# Patient Record
Sex: FEMALE | Race: WHITE | NOT HISPANIC OR LATINO
[De-identification: names, ages, dates, MRNs, and addresses within clinical notes are randomized per-mention and may not be internally consistent; named-entity substitution may affect disease eponyms.]

---

## 2018-08-02 PROBLEM — Z00.00 ENCOUNTER FOR PREVENTIVE HEALTH EXAMINATION: Status: ACTIVE | Noted: 2018-08-02

## 2018-08-09 ENCOUNTER — APPOINTMENT (OUTPATIENT)
Dept: OTOLARYNGOLOGY | Facility: CLINIC | Age: 68
End: 2018-08-09
Payer: MEDICARE

## 2018-08-09 VITALS
DIASTOLIC BLOOD PRESSURE: 73 MMHG | HEIGHT: 61 IN | WEIGHT: 130 LBS | SYSTOLIC BLOOD PRESSURE: 137 MMHG | HEART RATE: 75 BPM | BODY MASS INDEX: 24.55 KG/M2

## 2018-08-09 PROCEDURE — 99204 OFFICE O/P NEW MOD 45 MIN: CPT

## 2018-09-06 ENCOUNTER — APPOINTMENT (OUTPATIENT)
Dept: OTOLARYNGOLOGY | Facility: CLINIC | Age: 68
End: 2018-09-06
Payer: MEDICARE

## 2018-09-06 VITALS
SYSTOLIC BLOOD PRESSURE: 134 MMHG | WEIGHT: 130 LBS | DIASTOLIC BLOOD PRESSURE: 81 MMHG | HEART RATE: 68 BPM | BODY MASS INDEX: 24.56 KG/M2

## 2018-09-06 DIAGNOSIS — Z83.3 FAMILY HISTORY OF DIABETES MELLITUS: ICD-10-CM

## 2018-09-06 DIAGNOSIS — Z82.2 FAMILY HISTORY OF DEAFNESS AND HEARING LOSS: ICD-10-CM

## 2018-09-06 DIAGNOSIS — Z84.1 FAMILY HISTORY OF DISORDERS OF KIDNEY AND URETER: ICD-10-CM

## 2018-09-06 PROCEDURE — 99214 OFFICE O/P EST MOD 30 MIN: CPT

## 2018-09-06 RX ORDER — DILTIAZEM HYDROCHLORIDE 120 MG/1
120 TABLET, COATED ORAL
Refills: 0 | Status: ACTIVE | COMMUNITY

## 2018-09-06 RX ORDER — IRBESARTAN 150 MG/1
150 TABLET, FILM COATED ORAL
Refills: 0 | Status: ACTIVE | COMMUNITY

## 2018-09-06 RX ORDER — VIT C/E/CUPERIC/ZINC/LUTEIN 226-90-0.8
CAPSULE ORAL
Refills: 0 | Status: ACTIVE | COMMUNITY

## 2018-09-06 RX ORDER — ASPIRIN 81 MG
600-200 TABLET, DELAYED RELEASE (ENTERIC COATED) ORAL
Refills: 0 | Status: ACTIVE | COMMUNITY

## 2018-09-06 RX ORDER — ETANERCEPT 50 MG/ML
50 SOLUTION SUBCUTANEOUS
Refills: 0 | Status: ACTIVE | COMMUNITY

## 2018-09-06 RX ORDER — ATORVASTATIN CALCIUM 10 MG/1
10 TABLET, FILM COATED ORAL
Refills: 0 | Status: ACTIVE | COMMUNITY

## 2019-03-07 ENCOUNTER — APPOINTMENT (OUTPATIENT)
Dept: OTOLARYNGOLOGY | Facility: CLINIC | Age: 69
End: 2019-03-07

## 2022-06-13 ENCOUNTER — NON-APPOINTMENT (OUTPATIENT)
Age: 72
End: 2022-06-13

## 2023-06-11 ENCOUNTER — NON-APPOINTMENT (OUTPATIENT)
Age: 73
End: 2023-06-11

## 2023-06-21 ENCOUNTER — APPOINTMENT (OUTPATIENT)
Dept: ORTHOPEDIC SURGERY | Facility: CLINIC | Age: 73
End: 2023-06-21
Payer: MEDICARE

## 2023-06-21 ENCOUNTER — NON-APPOINTMENT (OUTPATIENT)
Age: 73
End: 2023-06-21

## 2023-06-21 VITALS
SYSTOLIC BLOOD PRESSURE: 171 MMHG | HEART RATE: 79 BPM | BODY MASS INDEX: 24.55 KG/M2 | DIASTOLIC BLOOD PRESSURE: 83 MMHG | HEIGHT: 61 IN | WEIGHT: 130 LBS

## 2023-06-21 DIAGNOSIS — M17.12 UNILATERAL PRIMARY OSTEOARTHRITIS, LEFT KNEE: ICD-10-CM

## 2023-06-21 DIAGNOSIS — M17.11 UNILATERAL PRIMARY OSTEOARTHRITIS, RIGHT KNEE: ICD-10-CM

## 2023-06-21 PROCEDURE — 73564 X-RAY EXAM KNEE 4 OR MORE: CPT | Mod: LT

## 2023-06-21 PROCEDURE — 73522 X-RAY EXAM HIPS BI 3-4 VIEWS: CPT

## 2023-06-21 PROCEDURE — 99204 OFFICE O/P NEW MOD 45 MIN: CPT | Mod: 25

## 2023-06-21 PROCEDURE — 20610 DRAIN/INJ JOINT/BURSA W/O US: CPT | Mod: LT

## 2023-06-21 PROCEDURE — 73560 X-RAY EXAM OF KNEE 1 OR 2: CPT | Mod: RT

## 2023-06-21 NOTE — DISCUSSION/SUMMARY
[de-identified] : This patient does have a psoriatic arthritis with severe deformity of her hands she also has arthritis in her left hip and in both knees.  She tolerated a local injection in her hip extremely well.  We will follow her conservatively at this time.  She is being treated medically by Dr. Braulio Rosario.  Return visit in 3 months.

## 2023-06-21 NOTE — PROCEDURE
[de-identified] : Procedure Note: \par \par Anatomic Location: left  hip\par \par Diagnosis: left  hip arthritis\par \par Procedure:  Injection of 6ccs of Xylocaine % plain and Kenalog 40, 1 cc\par \par Local Spray: Ethyl Chloride.\par \par Skin preparation with alcohol.\par \par Patient has consented for the procedure.\par \par Injection 20-gauge spinal needle  through an anterior  lateral approach.\par \par Patient tolerated the procedure well.\par

## 2023-06-21 NOTE — HISTORY OF PRESENT ILLNESS
[de-identified] : Ms. MULUGETA SRIVASTAVA is a 72 year female who presents to office complaining of left hip pain x 5 months with insidious onset.\par Denies injury, trauma, or fall to the hip.\par Pain is described as an intermittent, localized, sharp pain centered around the anterolateral aspect of the hip.\par Pain is aggravated with lying on the affected hip and with getting up from a seated position and walking up and down stairs.\par Pain is improved with rest, ice, and Tylenol.\par Denies associated radiating pain or distal neuropathy.\par She has a rheumatologist (Dr. Braulio Rosario) for severe psoriatic arthritis (taking Enbrel, now Skyrizi, as well as Celebrex) who has evaluated the patient for this pain and has obtained radiology images (x-ray, CT scan, and MRI).\par She had received a left hip GT bursa injection by Dr. Rosario about 2 months ago, which did not help her hip pain.\par All review of systems, family history, social history, surgical history, past medical history, medications, and allergies not previously stated as positive are negative. They were reviewed by me today with the patient and documented accordingly.

## 2023-06-21 NOTE — CONSULT LETTER
[FreeTextEntry1] : Dear Dr. Braulio Rosario\par \par I had the pleasure of evaluating your patient in the office today. I am sending you a copy of my Orthopaedic consult note for your record. If you have any questions, please do not hesitate to contact my office.\par \par Sincerely,\par \par Todd Vargas MD\par Chief, Adult Joint Reconstruction\par [facility]

## 2023-06-21 NOTE — PHYSICAL EXAM
[de-identified] : Constitutional - the patient is of normal build and nutrition.  She does have marked deformity of both of her hands as well as the toes of her feet due to her psoriatic arthritis.  The patient remains oriented to person, time, and  place.  Mood is normal. Vital signs as recorded.  The patients gait is slow with some discomfort in her left hip but she is managing satisfactorily.. The patient has satisfactory  balance and can stand on toes and heels.\par \par The patient has no difficulty with respiration. Respiration at rest is a normal rate. The patient is not short of breath and has not become short of breath with short ambulation. There is no audible wheezing. No coughing.\par \par Skin is normal for the patient's age. There are no abnormal masses or lymph nodes which stand out in the lower extremities.\par \par Spine - deep tendon reflexes are symmetric. Motor and sensory are symmetric.\par \par \par UPPER EXTREMITIES \par \par Shoulders ROM  is symmetric  and the motion is satisfactory.  There is no significant shoulder pain or limitation in motion which would make using a cane or a walker difficult. Shoulder stability and  strength are satisfactory.\par \par Her hands show multiple deformities is secondary to her psoriatic arthritis.\par \par There is normal motion in the wrists and elbows.\par \par Circulation appears satisfactory with pedal pulses present.  There is no major edema in the lower legs. No skin tenderness or increased temperature. No major varicosities.\par \par HIP EXAMINATION the abduction and abduction as well as rotation measurements were taken with the hip in flexion.\par \par Motion\par Her hip motion shows at this time flexion right hip 130 left hip 125, abduction right hip 70 the left hip 50, adduction right hip 35 left hip 20, external rotation right hip 70 left hip 50, internal rotation right hip 20 left hip 10.  She has some discomfort in the left groin and left hip area with weightbearing and with the extremities of range of motion.  \par \par The hips have good range of motion.  He does guard her left hip slightly.   There is no crepitus in either hip. The alignment of the hips is normal.\par \par \par KNEE EXAMINATION\par \par Motion\par Right Knee has 0 to 125 degrees of motion with good medial  lateral and anterior posterior stability.  There is no major effusion.  There is no Baker's cyst.  There is t patellofemoral crepitus.  She does have intermittent pain in her knees however at this time her right knee is feeling satisfactorily the patient has satisfactory strength across the knee.               \par Left  Knee   has 0 to 135 degrees of motion with good medial lateral and anterior posterior stability.  There is no major effusion there is no Baker's cyst.  There is  patellofemoral crepitus.  This knee also has intermittent discomfort at this time again she says she is functioning satisfactorily.  The patient has satisfactory strength across the knee.            \par \par Ankle and foot examination\par Of the ankle has normal motion.  There is normal ankle stability.  The patient has no major abnormalities of the foot.\par \par \par \par  [de-identified] : An AP of the pelvis and a lateral of the right and left hips shows the femoral heads are round the right hip shows joint space maintained throughout the hip the left hip shows an increased peripheral osteophyte around the superior acetabulum and loss of superior cartilage space.\par \par 4 views were done of the left knee and an AP and Roa view of the right knee.  These do show significant joint space arthritis in the medial and lateral compartments of both knees but bone against bone medially the right knee may be more severe at this time than the left and there are left knee peripheral degenerative changes.  Impression severe osteoarthritis knees psoriatic arthritis by history.

## 2023-06-23 ENCOUNTER — TRANSCRIPTION ENCOUNTER (OUTPATIENT)
Age: 73
End: 2023-06-23

## 2023-08-02 ENCOUNTER — APPOINTMENT (OUTPATIENT)
Dept: ORTHOPEDIC SURGERY | Facility: CLINIC | Age: 73
End: 2023-08-02
Payer: MEDICARE

## 2023-08-02 VITALS
DIASTOLIC BLOOD PRESSURE: 81 MMHG | HEIGHT: 61 IN | SYSTOLIC BLOOD PRESSURE: 136 MMHG | HEART RATE: 71 BPM | BODY MASS INDEX: 25.3 KG/M2 | WEIGHT: 134 LBS

## 2023-08-02 DIAGNOSIS — M17.0 BILATERAL PRIMARY OSTEOARTHRITIS OF KNEE: ICD-10-CM

## 2023-08-02 DIAGNOSIS — M16.12 UNILATERAL PRIMARY OSTEOARTHRITIS, LEFT HIP: ICD-10-CM

## 2023-08-02 PROCEDURE — 99214 OFFICE O/P EST MOD 30 MIN: CPT

## 2023-08-17 ENCOUNTER — OUTPATIENT (OUTPATIENT)
Dept: OUTPATIENT SERVICES | Facility: HOSPITAL | Age: 73
LOS: 1 days | End: 2023-08-17
Payer: MEDICARE

## 2023-08-17 ENCOUNTER — NON-APPOINTMENT (OUTPATIENT)
Age: 73
End: 2023-08-17

## 2023-08-17 VITALS
SYSTOLIC BLOOD PRESSURE: 117 MMHG | TEMPERATURE: 98 F | WEIGHT: 132.94 LBS | HEIGHT: 61 IN | HEART RATE: 88 BPM | OXYGEN SATURATION: 97 % | DIASTOLIC BLOOD PRESSURE: 78 MMHG

## 2023-08-17 DIAGNOSIS — Z01.818 ENCOUNTER FOR OTHER PREPROCEDURAL EXAMINATION: ICD-10-CM

## 2023-08-17 DIAGNOSIS — Z90.49 ACQUIRED ABSENCE OF OTHER SPECIFIED PARTS OF DIGESTIVE TRACT: Chronic | ICD-10-CM

## 2023-08-17 DIAGNOSIS — M16.12 UNILATERAL PRIMARY OSTEOARTHRITIS, LEFT HIP: ICD-10-CM

## 2023-08-17 DIAGNOSIS — Z98.890 OTHER SPECIFIED POSTPROCEDURAL STATES: Chronic | ICD-10-CM

## 2023-08-17 DIAGNOSIS — Z29.9 ENCOUNTER FOR PROPHYLACTIC MEASURES, UNSPECIFIED: ICD-10-CM

## 2023-08-17 LAB
A1C WITH ESTIMATED AVERAGE GLUCOSE RESULT: 6.4 % — HIGH (ref 4–5.6)
ANION GAP SERPL CALC-SCNC: 15 MMOL/L — SIGNIFICANT CHANGE UP (ref 5–17)
BLD GP AB SCN SERPL QL: NEGATIVE — SIGNIFICANT CHANGE UP
BUN SERPL-MCNC: 24 MG/DL — HIGH (ref 7–23)
CALCIUM SERPL-MCNC: 10.8 MG/DL — HIGH (ref 8.4–10.5)
CHLORIDE SERPL-SCNC: 102 MMOL/L — SIGNIFICANT CHANGE UP (ref 96–108)
CO2 SERPL-SCNC: 24 MMOL/L — SIGNIFICANT CHANGE UP (ref 22–31)
CREAT SERPL-MCNC: 0.92 MG/DL — SIGNIFICANT CHANGE UP (ref 0.5–1.3)
EGFR: 65 ML/MIN/1.73M2 — SIGNIFICANT CHANGE UP
ESTIMATED AVERAGE GLUCOSE: 137 MG/DL — HIGH (ref 68–114)
GLUCOSE SERPL-MCNC: 124 MG/DL — HIGH (ref 70–99)
HCT VFR BLD CALC: 45.8 % — HIGH (ref 34.5–45)
HGB BLD-MCNC: 15.1 G/DL — SIGNIFICANT CHANGE UP (ref 11.5–15.5)
MCHC RBC-ENTMCNC: 30.1 PG — SIGNIFICANT CHANGE UP (ref 27–34)
MCHC RBC-ENTMCNC: 33 GM/DL — SIGNIFICANT CHANGE UP (ref 32–36)
MCV RBC AUTO: 91.2 FL — SIGNIFICANT CHANGE UP (ref 80–100)
NRBC # BLD: 0 /100 WBCS — SIGNIFICANT CHANGE UP (ref 0–0)
PLATELET # BLD AUTO: 335 K/UL — SIGNIFICANT CHANGE UP (ref 150–400)
POTASSIUM SERPL-MCNC: 5.2 MMOL/L — SIGNIFICANT CHANGE UP (ref 3.5–5.3)
POTASSIUM SERPL-SCNC: 5.2 MMOL/L — SIGNIFICANT CHANGE UP (ref 3.5–5.3)
RBC # BLD: 5.02 M/UL — SIGNIFICANT CHANGE UP (ref 3.8–5.2)
RBC # FLD: 13.2 % — SIGNIFICANT CHANGE UP (ref 10.3–14.5)
RH IG SCN BLD-IMP: POSITIVE — SIGNIFICANT CHANGE UP
SODIUM SERPL-SCNC: 141 MMOL/L — SIGNIFICANT CHANGE UP (ref 135–145)
WBC # BLD: 12.32 K/UL — HIGH (ref 3.8–10.5)
WBC # FLD AUTO: 12.32 K/UL — HIGH (ref 3.8–10.5)

## 2023-08-17 PROCEDURE — 83036 HEMOGLOBIN GLYCOSYLATED A1C: CPT

## 2023-08-17 PROCEDURE — 80048 BASIC METABOLIC PNL TOTAL CA: CPT

## 2023-08-17 PROCEDURE — 87640 STAPH A DNA AMP PROBE: CPT

## 2023-08-17 PROCEDURE — 86850 RBC ANTIBODY SCREEN: CPT

## 2023-08-17 PROCEDURE — 86900 BLOOD TYPING SEROLOGIC ABO: CPT

## 2023-08-17 PROCEDURE — 85027 COMPLETE CBC AUTOMATED: CPT

## 2023-08-17 PROCEDURE — G0463: CPT

## 2023-08-17 PROCEDURE — 86901 BLOOD TYPING SEROLOGIC RH(D): CPT

## 2023-08-17 PROCEDURE — 87641 MR-STAPH DNA AMP PROBE: CPT

## 2023-08-17 NOTE — H&P PST ADULT - RESPIRATORY
normal/clear to auscultation bilaterally/no wheezes/breath sounds equal/good air movement/respirations non-labored

## 2023-08-17 NOTE — H&P PST ADULT - PROBLEM SELECTOR PLAN 1
Scheduled for sx on 8/31/23. Surgical, chlorhexidine and Ensure instructions reviewed and provided to pt.

## 2023-08-17 NOTE — H&P PST ADULT - NSICDXFAMILYHX_GEN_ALL_CORE_FT
FAMILY HISTORY:  Father  Still living? No  Family history of renal failure, Age at diagnosis: Age Unknown  FH: HTN (hypertension), Age at diagnosis: Age Unknown    Mother  Still living? No  FH: HTN (hypertension), Age at diagnosis: Age Unknown

## 2023-08-17 NOTE — H&P PST ADULT - HISTORY OF PRESENT ILLNESS
72 year female with complaints of left hip pain x 6 months, worse over the last 2 months, with insidious onset and presents to PST today for a scheduled Left Total Hip Replacement Posterior on 8/31/23. PMH of HTN, HLD and Psoriatic Arthritis. Denies recent fevers, chills, cough, chest pain or SOB and feels well otherwise.

## 2023-08-17 NOTE — H&P PST ADULT - PRIMARY CARE PROVIDER
Dr Braulio Rosario ([facility]) [phone] Dr Braulio Rosario ([facility]) [phone] alejandra 8/25/23

## 2023-08-17 NOTE — H&P PST ADULT - ATTENDING COMMENTS
This patient did hAVE AN ELEVATED BLOOD SUGAR THIS PROBABLY DUE TO THE ENSURE. SHE IS GIVEN A SLIDING SCALE INSULIN DOES AND SURGAR WILL BE REPEATED. IF SATIS THEN PROCEDURE WITH PROCEDURE AND INTERNIST TO DO A FULL EVAL POST OP. HER HA1C WAS ELEVATED BUT ACCEPTABLE IN THE PRE OP WORK UP.

## 2023-08-17 NOTE — H&P PST ADULT - NSICDXPASTMEDICALHX_GEN_ALL_CORE_FT
PAST MEDICAL HISTORY:  2019 novel coronavirus disease (COVID-19)     HLD (hyperlipidemia)     HTN (hypertension)     Psoriatic arthritis     Unilateral primary osteoarthritis, left hip

## 2023-08-17 NOTE — H&P PST ADULT - NSICDXPASTSURGICALHX_GEN_ALL_CORE_FT
PAST SURGICAL HISTORY:  History of cholecystectomy     History of colonoscopy     History of endoscopy

## 2023-08-18 LAB
MRSA PCR RESULT.: SIGNIFICANT CHANGE UP
S AUREUS DNA NOSE QL NAA+PROBE: SIGNIFICANT CHANGE UP

## 2023-08-30 ENCOUNTER — TRANSCRIPTION ENCOUNTER (OUTPATIENT)
Age: 73
End: 2023-08-30

## 2023-08-31 ENCOUNTER — TRANSCRIPTION ENCOUNTER (OUTPATIENT)
Age: 73
End: 2023-08-31

## 2023-08-31 ENCOUNTER — APPOINTMENT (OUTPATIENT)
Dept: ORTHOPEDIC SURGERY | Facility: HOSPITAL | Age: 73
End: 2023-08-31

## 2023-08-31 ENCOUNTER — INPATIENT (INPATIENT)
Facility: HOSPITAL | Age: 73
LOS: 1 days | Discharge: HOME | DRG: 470 | End: 2023-09-02
Attending: ORTHOPAEDIC SURGERY | Admitting: ORTHOPAEDIC SURGERY
Payer: MEDICARE

## 2023-08-31 VITALS
WEIGHT: 132.94 LBS | OXYGEN SATURATION: 96 % | HEART RATE: 88 BPM | SYSTOLIC BLOOD PRESSURE: 157 MMHG | HEIGHT: 60.98 IN | RESPIRATION RATE: 15 BRPM | DIASTOLIC BLOOD PRESSURE: 85 MMHG | TEMPERATURE: 98 F

## 2023-08-31 DIAGNOSIS — Z98.890 OTHER SPECIFIED POSTPROCEDURAL STATES: Chronic | ICD-10-CM

## 2023-08-31 DIAGNOSIS — Z90.49 ACQUIRED ABSENCE OF OTHER SPECIFIED PARTS OF DIGESTIVE TRACT: Chronic | ICD-10-CM

## 2023-08-31 DIAGNOSIS — M16.12 UNILATERAL PRIMARY OSTEOARTHRITIS, LEFT HIP: ICD-10-CM

## 2023-08-31 LAB
GLUCOSE BLDC GLUCOMTR-MCNC: 120 MG/DL — HIGH (ref 70–99)
GLUCOSE BLDC GLUCOMTR-MCNC: 134 MG/DL — HIGH (ref 70–99)
GLUCOSE BLDC GLUCOMTR-MCNC: 141 MG/DL — HIGH (ref 70–99)
GLUCOSE BLDC GLUCOMTR-MCNC: 162 MG/DL — HIGH (ref 70–99)
GLUCOSE BLDC GLUCOMTR-MCNC: 231 MG/DL — HIGH (ref 70–99)
GLUCOSE BLDC GLUCOMTR-MCNC: 301 MG/DL — HIGH (ref 70–99)
GLUCOSE BLDC GLUCOMTR-MCNC: 75 MG/DL — SIGNIFICANT CHANGE UP (ref 70–99)

## 2023-08-31 PROCEDURE — 27130 TOTAL HIP ARTHROPLASTY: CPT | Mod: LT

## 2023-08-31 PROCEDURE — 73501 X-RAY EXAM HIP UNI 1 VIEW: CPT | Mod: 26,LT

## 2023-08-31 PROCEDURE — 72170 X-RAY EXAM OF PELVIS: CPT | Mod: 26,XU

## 2023-08-31 DEVICE — SCREW 6.5MM X 25MM: Type: IMPLANTABLE DEVICE | Site: LEFT | Status: FUNCTIONAL

## 2023-08-31 DEVICE — IMPLANTABLE DEVICE: Type: IMPLANTABLE DEVICE | Site: LEFT | Status: FUNCTIONAL

## 2023-08-31 DEVICE — HEAD FEM CERAMIC DELTA 32MM NEG 4MM: Type: IMPLANTABLE DEVICE | Site: LEFT | Status: FUNCTIONAL

## 2023-08-31 RX ORDER — SODIUM CHLORIDE 9 MG/ML
1000 INJECTION, SOLUTION INTRAVENOUS
Refills: 0 | Status: DISCONTINUED | OUTPATIENT
Start: 2023-08-31 | End: 2023-09-02

## 2023-08-31 RX ORDER — DEXTROSE 50 % IN WATER 50 %
12.5 SYRINGE (ML) INTRAVENOUS ONCE
Refills: 0 | Status: DISCONTINUED | OUTPATIENT
Start: 2023-08-31 | End: 2023-09-02

## 2023-08-31 RX ORDER — CELECOXIB 200 MG/1
200 CAPSULE ORAL EVERY 12 HOURS
Refills: 0 | Status: DISCONTINUED | OUTPATIENT
Start: 2023-09-01 | End: 2023-09-02

## 2023-08-31 RX ORDER — ACETAMINOPHEN 500 MG
1000 TABLET ORAL ONCE
Refills: 0 | Status: COMPLETED | OUTPATIENT
Start: 2023-09-01 | End: 2023-09-01

## 2023-08-31 RX ORDER — CHLORHEXIDINE GLUCONATE 213 G/1000ML
1 SOLUTION TOPICAL ONCE
Refills: 0 | Status: COMPLETED | OUTPATIENT
Start: 2023-08-31 | End: 2023-08-31

## 2023-08-31 RX ORDER — HYDROMORPHONE HYDROCHLORIDE 2 MG/ML
0.5 INJECTION INTRAMUSCULAR; INTRAVENOUS; SUBCUTANEOUS
Refills: 0 | Status: DISCONTINUED | OUTPATIENT
Start: 2023-08-31 | End: 2023-08-31

## 2023-08-31 RX ORDER — MULTIVIT-MIN/FERROUS GLUCONATE 9 MG/15 ML
0 LIQUID (ML) ORAL
Refills: 0 | DISCHARGE

## 2023-08-31 RX ORDER — INSULIN LISPRO 100/ML
VIAL (ML) SUBCUTANEOUS
Refills: 0 | Status: DISCONTINUED | OUTPATIENT
Start: 2023-08-31 | End: 2023-09-02

## 2023-08-31 RX ORDER — SODIUM CHLORIDE 9 MG/ML
500 INJECTION INTRAMUSCULAR; INTRAVENOUS; SUBCUTANEOUS ONCE
Refills: 0 | Status: COMPLETED | OUTPATIENT
Start: 2023-08-31 | End: 2023-09-01

## 2023-08-31 RX ORDER — LIDOCAINE HCL 20 MG/ML
0.2 VIAL (ML) INJECTION ONCE
Refills: 0 | Status: DISCONTINUED | OUTPATIENT
Start: 2023-08-31 | End: 2023-08-31

## 2023-08-31 RX ORDER — GLUCAGON INJECTION, SOLUTION 0.5 MG/.1ML
1 INJECTION, SOLUTION SUBCUTANEOUS ONCE
Refills: 0 | Status: DISCONTINUED | OUTPATIENT
Start: 2023-08-31 | End: 2023-09-02

## 2023-08-31 RX ORDER — KETOROLAC TROMETHAMINE 30 MG/ML
15 SYRINGE (ML) INJECTION EVERY 6 HOURS
Refills: 0 | Status: DISCONTINUED | OUTPATIENT
Start: 2023-08-31 | End: 2023-08-31

## 2023-08-31 RX ORDER — OXYCODONE HYDROCHLORIDE 5 MG/1
10 TABLET ORAL EVERY 4 HOURS
Refills: 0 | Status: DISCONTINUED | OUTPATIENT
Start: 2023-08-31 | End: 2023-09-02

## 2023-08-31 RX ORDER — SODIUM CHLORIDE 9 MG/ML
500 INJECTION INTRAMUSCULAR; INTRAVENOUS; SUBCUTANEOUS ONCE
Refills: 0 | Status: COMPLETED | OUTPATIENT
Start: 2023-08-31 | End: 2023-08-31

## 2023-08-31 RX ORDER — TRAMADOL HYDROCHLORIDE 50 MG/1
50 TABLET ORAL EVERY 6 HOURS
Refills: 0 | Status: DISCONTINUED | OUTPATIENT
Start: 2023-08-31 | End: 2023-09-02

## 2023-08-31 RX ORDER — ACETAMINOPHEN 500 MG
975 TABLET ORAL EVERY 8 HOURS
Refills: 0 | Status: DISCONTINUED | OUTPATIENT
Start: 2023-09-01 | End: 2023-09-02

## 2023-08-31 RX ORDER — INSULIN LISPRO 100/ML
4 VIAL (ML) SUBCUTANEOUS ONCE
Refills: 0 | Status: DISCONTINUED | OUTPATIENT
Start: 2023-08-31 | End: 2023-08-31

## 2023-08-31 RX ORDER — DILTIAZEM HCL 120 MG
120 CAPSULE, EXT RELEASE 24 HR ORAL
Refills: 0 | Status: DISCONTINUED | OUTPATIENT
Start: 2023-08-31 | End: 2023-09-02

## 2023-08-31 RX ORDER — DEXTROSE 50 % IN WATER 50 %
15 SYRINGE (ML) INTRAVENOUS ONCE
Refills: 0 | Status: DISCONTINUED | OUTPATIENT
Start: 2023-08-31 | End: 2023-09-02

## 2023-08-31 RX ORDER — LOSARTAN POTASSIUM 100 MG/1
50 TABLET, FILM COATED ORAL
Refills: 0 | Status: DISCONTINUED | OUTPATIENT
Start: 2023-08-31 | End: 2023-09-02

## 2023-08-31 RX ORDER — OXYCODONE HYDROCHLORIDE 5 MG/1
5 TABLET ORAL EVERY 4 HOURS
Refills: 0 | Status: DISCONTINUED | OUTPATIENT
Start: 2023-08-31 | End: 2023-09-02

## 2023-08-31 RX ORDER — PANTOPRAZOLE SODIUM 20 MG/1
40 TABLET, DELAYED RELEASE ORAL ONCE
Refills: 0 | Status: COMPLETED | OUTPATIENT
Start: 2023-08-31 | End: 2023-08-31

## 2023-08-31 RX ORDER — HYDROMORPHONE HYDROCHLORIDE 2 MG/ML
0.5 INJECTION INTRAMUSCULAR; INTRAVENOUS; SUBCUTANEOUS ONCE
Refills: 0 | Status: DISCONTINUED | OUTPATIENT
Start: 2023-08-31 | End: 2023-09-02

## 2023-08-31 RX ORDER — SENNA PLUS 8.6 MG/1
2 TABLET ORAL AT BEDTIME
Refills: 0 | Status: DISCONTINUED | OUTPATIENT
Start: 2023-08-31 | End: 2023-09-02

## 2023-08-31 RX ORDER — CEFAZOLIN SODIUM 1 G
2000 VIAL (EA) INJECTION ONCE
Refills: 0 | Status: COMPLETED | OUTPATIENT
Start: 2023-08-31 | End: 2023-08-31

## 2023-08-31 RX ORDER — INSULIN LISPRO 100/ML
2 VIAL (ML) SUBCUTANEOUS ONCE
Refills: 0 | Status: COMPLETED | OUTPATIENT
Start: 2023-08-31 | End: 2023-08-31

## 2023-08-31 RX ORDER — MAGNESIUM HYDROXIDE 400 MG/1
30 TABLET, CHEWABLE ORAL DAILY
Refills: 0 | Status: DISCONTINUED | OUTPATIENT
Start: 2023-08-31 | End: 2023-09-02

## 2023-08-31 RX ORDER — ONDANSETRON 8 MG/1
4 TABLET, FILM COATED ORAL EVERY 6 HOURS
Refills: 0 | Status: DISCONTINUED | OUTPATIENT
Start: 2023-08-31 | End: 2023-09-02

## 2023-08-31 RX ORDER — CEFAZOLIN SODIUM 1 G
2000 VIAL (EA) INJECTION EVERY 8 HOURS
Refills: 0 | Status: COMPLETED | OUTPATIENT
Start: 2023-08-31 | End: 2023-09-01

## 2023-08-31 RX ORDER — KETOROLAC TROMETHAMINE 30 MG/ML
15 SYRINGE (ML) INJECTION EVERY 6 HOURS
Refills: 0 | Status: DISCONTINUED | OUTPATIENT
Start: 2023-08-31 | End: 2023-09-01

## 2023-08-31 RX ORDER — DEXTROSE 50 % IN WATER 50 %
25 SYRINGE (ML) INTRAVENOUS ONCE
Refills: 0 | Status: DISCONTINUED | OUTPATIENT
Start: 2023-08-31 | End: 2023-09-02

## 2023-08-31 RX ORDER — SODIUM CHLORIDE 9 MG/ML
3 INJECTION INTRAMUSCULAR; INTRAVENOUS; SUBCUTANEOUS EVERY 8 HOURS
Refills: 0 | Status: DISCONTINUED | OUTPATIENT
Start: 2023-08-31 | End: 2023-08-31

## 2023-08-31 RX ORDER — DILTIAZEM HCL 120 MG
240 CAPSULE, EXT RELEASE 24 HR ORAL
Refills: 0 | Status: DISCONTINUED | OUTPATIENT
Start: 2023-08-31 | End: 2023-09-02

## 2023-08-31 RX ORDER — POLYETHYLENE GLYCOL 3350 17 G/17G
17 POWDER, FOR SOLUTION ORAL AT BEDTIME
Refills: 0 | Status: DISCONTINUED | OUTPATIENT
Start: 2023-08-31 | End: 2023-09-02

## 2023-08-31 RX ORDER — TRAMADOL HYDROCHLORIDE 50 MG/1
50 TABLET ORAL ONCE
Refills: 0 | Status: DISCONTINUED | OUTPATIENT
Start: 2023-08-31 | End: 2023-08-31

## 2023-08-31 RX ORDER — PANTOPRAZOLE SODIUM 20 MG/1
40 TABLET, DELAYED RELEASE ORAL
Refills: 0 | Status: DISCONTINUED | OUTPATIENT
Start: 2023-08-31 | End: 2023-09-02

## 2023-08-31 RX ORDER — ACETAMINOPHEN 500 MG
1000 TABLET ORAL ONCE
Refills: 0 | Status: COMPLETED | OUTPATIENT
Start: 2023-08-31 | End: 2023-08-31

## 2023-08-31 RX ORDER — ASPIRIN/CALCIUM CARB/MAGNESIUM 324 MG
325 TABLET ORAL
Refills: 0 | Status: DISCONTINUED | OUTPATIENT
Start: 2023-08-31 | End: 2023-09-02

## 2023-08-31 RX ORDER — INSULIN LISPRO 100/ML
VIAL (ML) SUBCUTANEOUS AT BEDTIME
Refills: 0 | Status: DISCONTINUED | OUTPATIENT
Start: 2023-08-31 | End: 2023-09-02

## 2023-08-31 RX ORDER — ACETAMINOPHEN 500 MG
1000 TABLET ORAL ONCE
Refills: 0 | Status: DISCONTINUED | OUTPATIENT
Start: 2023-08-31 | End: 2023-08-31

## 2023-08-31 RX ORDER — ATORVASTATIN CALCIUM 80 MG/1
10 TABLET, FILM COATED ORAL AT BEDTIME
Refills: 0 | Status: DISCONTINUED | OUTPATIENT
Start: 2023-08-31 | End: 2023-09-02

## 2023-08-31 RX ORDER — ATORVASTATIN CALCIUM 80 MG/1
1 TABLET, FILM COATED ORAL
Refills: 0 | DISCHARGE

## 2023-08-31 RX ADMIN — Medication 15 MILLIGRAM(S): at 21:39

## 2023-08-31 RX ADMIN — Medication 1000 MILLIGRAM(S): at 21:10

## 2023-08-31 RX ADMIN — Medication 325 MILLIGRAM(S): at 17:26

## 2023-08-31 RX ADMIN — TRAMADOL HYDROCHLORIDE 50 MILLIGRAM(S): 50 TABLET ORAL at 21:08

## 2023-08-31 RX ADMIN — Medication 15 MILLIGRAM(S): at 15:04

## 2023-08-31 RX ADMIN — TRAMADOL HYDROCHLORIDE 50 MILLIGRAM(S): 50 TABLET ORAL at 10:26

## 2023-08-31 RX ADMIN — Medication 240 MILLIGRAM(S): at 14:18

## 2023-08-31 RX ADMIN — OXYCODONE HYDROCHLORIDE 5 MILLIGRAM(S): 5 TABLET ORAL at 22:06

## 2023-08-31 RX ADMIN — Medication 15 MILLIGRAM(S): at 21:09

## 2023-08-31 RX ADMIN — Medication 400 MILLIGRAM(S): at 20:50

## 2023-08-31 RX ADMIN — Medication 100 MILLIGRAM(S): at 21:08

## 2023-08-31 RX ADMIN — TRAMADOL HYDROCHLORIDE 50 MILLIGRAM(S): 50 TABLET ORAL at 22:06

## 2023-08-31 RX ADMIN — Medication 120 MILLIGRAM(S): at 21:10

## 2023-08-31 RX ADMIN — CHLORHEXIDINE GLUCONATE 1 APPLICATION(S): 213 SOLUTION TOPICAL at 09:36

## 2023-08-31 RX ADMIN — SODIUM CHLORIDE 500 MILLILITER(S): 9 INJECTION INTRAMUSCULAR; INTRAVENOUS; SUBCUTANEOUS at 14:33

## 2023-08-31 RX ADMIN — ATORVASTATIN CALCIUM 10 MILLIGRAM(S): 80 TABLET, FILM COATED ORAL at 21:08

## 2023-08-31 RX ADMIN — Medication 2 UNIT(S): at 09:35

## 2023-08-31 RX ADMIN — Medication 15 MILLIGRAM(S): at 15:34

## 2023-08-31 RX ADMIN — SODIUM CHLORIDE 500 MILLILITER(S): 9 INJECTION INTRAMUSCULAR; INTRAVENOUS; SUBCUTANEOUS at 18:16

## 2023-08-31 RX ADMIN — PANTOPRAZOLE SODIUM 40 MILLIGRAM(S): 20 TABLET, DELAYED RELEASE ORAL at 10:26

## 2023-08-31 RX ADMIN — OXYCODONE HYDROCHLORIDE 5 MILLIGRAM(S): 5 TABLET ORAL at 23:06

## 2023-08-31 NOTE — DISCHARGE NOTE PROVIDER - CARE PROVIDER_API CALL
Todd Vargas  Orthopaedic Surgery  [address], [address]  [place] [address]  Phone: [phone]  Fax: [phone]  Follow Up Time: 2 weeks

## 2023-08-31 NOTE — DISCHARGE NOTE PROVIDER - HOSPITAL COURSE
History of Present Illness:	  72 year female with complaints of left hip pain x 6 months, worse over the last 2 months, with insidious onset and presents to [facility] for a scheduled Left Total Hip Replacement Posterior. PMH of HTN, HLD and Psoriatic Arthritis. Denies recent fevers, chills, cough, chest pain or SOB and feels well otherwise.     Allergies:  methotrexate: Drug, Nephrotoxicity, Rash    Past Medical History:  2019 novel coronavirus disease (COVID-19)   HLD (hyperlipidemia)   HTN (hypertension)   Psoriatic arthritis   Unilateral primary osteoarthritis, left hip.     Past Surgical History:  cholecystectomy   colonoscopy   endoscopy.     Hospital Course:  After admission on 8/31/2023 and receiving pre-operative parenteral prophylactic antibiotics, the patient underwent an uncomplicated Left total hip replacement with a posterior approach with Dr. Vargas. Patient tolerated the procedure well and was transferred to the recovery room in stable condition, with a stable neuro / vascular exam of the operated extremity.    Patient was placed on Ecotrin 325mg twice daily for DVT ppx, and was placed on Protonix for GI protection.   Patient was made weight bearing as tolerated with the operative leg.     Typical Physical & occupational therapy modalities post surgery were performed by physical and occupational therapies, including ambulation training, range of motion, ADL's, abd transfers.  After progression of mobility guided by the PT/ OT staff,  the patient was felt to benefit from further rehabilitative care for restoration to level of function. This was felt to best be accomplished at home with home PT.  Discharge and Orthopedic Care instructions were delineated in the Discharge Plan and reviewed with the patient. All medications were delineated in the medication reconciliation tool and key points were reviewed with the patient. They were deemed stable from an Orthopedic & medical standpoint for discharge *** History of Present Illness:	  72 year female with complaints of left hip pain x 6 months, worse over the last 2 months, with insidious onset and presents to [facility] for a scheduled Left Total Hip Replacement Posterior. PMH of HTN, HLD and Psoriatic Arthritis. Denies recent fevers, chills, cough, chest pain or SOB and feels well otherwise.     GOC: "I want to walk, and I want to travel"    Allergies:  methotrexate: Drug, Nephrotoxicity, Rash    Past Medical History:  2019 novel coronavirus disease (COVID-19)   HLD (hyperlipidemia)   HTN (hypertension)   Psoriatic arthritis   Unilateral primary osteoarthritis, left hip.     Past Surgical History:  cholecystectomy   colonoscopy   endoscopy.     Hospital Course:  After admission on 8/31/2023 and receiving pre-operative parenteral prophylactic antibiotics, the patient underwent an uncomplicated Left total hip replacement with a posterior approach with Dr. Vargas. Patient tolerated the procedure well and was transferred to the recovery room in stable condition, with a stable neuro / vascular exam of the operated extremity.    Patient was placed on Ecotrin 325mg twice daily for DVT ppx, and was placed on Protonix for GI protection.   Patient was made weight bearing as tolerated with the operative leg.     Typical Physical & occupational therapy modalities post surgery were performed by physical and occupational therapies, including ambulation training, range of motion, ADL's, abd transfers.  After progression of mobility guided by the PT/ OT staff,  the patient was felt to benefit from further rehabilitative care for restoration to level of function. This was felt to best be accomplished at home with home PT.  Discharge and Orthopedic Care instructions were delineated in the Discharge Plan and reviewed with the patient. All medications were delineated in the medication reconciliation tool and key points were reviewed with the patient. They were deemed stable from an Orthopedic & medical standpoint for discharge.

## 2023-08-31 NOTE — PHYSICAL THERAPY INITIAL EVALUATION ADULT - ACTIVE RANGE OF MOTION EXAMINATION, REHAB EVAL
L hip within hip precautions; b/l hands arthritic with ulnar deviation (pt reports chronic for 30+ years)./bilateral upper extremity Active ROM was WFL (within functional limits)/bilateral  lower extremity Active ROM was WFL (within functional limits)

## 2023-08-31 NOTE — PATIENT PROFILE ADULT - FALL HARM RISK - HARM RISK INTERVENTIONS

## 2023-08-31 NOTE — DISCHARGE NOTE PROVIDER - NSDCMRMEDTOKEN_GEN_ALL_CORE_FT
Avapro 150 mg oral tablet: 1 orally 2 times a day  Calcium 600+D 600 mg-5 mcg (200 intl units) oral tablet: 1 orally once a day  CeleBREX 200 mg oral capsule: 1 orally 2 times a day  DilTIAZem (Eqv-Cardizem CD) 120 mg/24 hours oral capsule, extended release: 2 orally once a day 2tabs in AM and 1tab in PM  Humira 40 mg/0.8 mL subcutaneous kit: 40 subcutaneously every other week On Tuesday  Lipitor 10 mg oral tablet: 1 orally once a day (at bedtime)  PreserVision AREDS 2 oral capsule: orally 2 times a day  Prolia 60 mg/mL subcutaneous solution: 60 subcutaneously every 6 months last dose in May 2023   Avapro 150 mg oral tablet: 1 orally 2 times a day  Calcium 600+D 600 mg-5 mcg (200 intl units) oral tablet: 1 orally once a day  CeleBREX 200 mg oral capsule: 1 orally 2 times a day  DilTIAZem (Eqv-Cardizem CD) 120 mg/24 hours oral capsule, extended release: 2 orally once a day 2tabs in AM and 1tab in PM  Humira 40 mg/0.8 mL subcutaneous kit: 40 kit subcutaneously every other week On Tuesday. Post surgery Restart when instructed by your rheumatologist.  Lipitor 10 mg oral tablet: 1 orally once a day (at bedtime)  PreserVision AREDS 2 oral capsule: orally 2 times a day  Prolia 60 mg/mL subcutaneous solution: 60 subcutaneously every 6 months last dose in May 2023   acetaminophen 325 mg oral tablet: 3 tab(s) orally every 8 hours X 5 days, then as needed for mild pain  Avapro 150 mg oral tablet: 1 orally 2 times a day  Calcium 600+D 600 mg-5 mcg (200 intl units) oral tablet: 1 orally once a day  CeleBREX 200 mg oral capsule: 1 orally 2 times a day  DilTIAZem (Eqv-Cardizem CD) 120 mg/24 hours oral capsule, extended release: 2 orally once a day 2tabs in AM and 1tab in PM  Humira 40 mg/0.8 mL subcutaneous kit: 40 kit subcutaneously every other week On Tuesday. Post surgery Restart when instructed by your rheumatologist.  Lipitor 10 mg oral tablet: 1 orally once a day (at bedtime)  PreserVision AREDS 2 oral capsule: orally 2 times a day  Prolia 60 mg/mL subcutaneous solution: 60 solution subcutaneously every 6 months last dose in May 2023. Post surgery resume this medication as instructed by your rheumatologist   acetaminophen 325 mg oral tablet: 3 tab(s) orally every 8 hours X 5 days, then as needed for mild pain  aspirin 325 mg oral delayed release tablet: 1 tab(s) orally 2 times a day X 6 Weeks to prevent blood clots MDD: 2  Avapro 150 mg oral tablet: 1 orally 2 times a day  Calcium 600+D 600 mg-5 mcg (200 intl units) oral tablet: 1 orally once a day  CeleBREX 200 mg oral capsule: 1 orally 2 times a day  DilTIAZem (Eqv-Cardizem CD) 120 mg/24 hours oral capsule, extended release: 2 orally once a day 2tabs in AM and 1tab in PM  Humira 40 mg/0.8 mL subcutaneous kit: 40 kit subcutaneously every other week On Tuesday. Post surgery Restart when instructed by your rheumatologist.  Lipitor 10 mg oral tablet: 1 orally once a day (at bedtime)  Narcan 4 mg/0.1 mL nasal spray: 4 milligram(s) intranasally every 2 to 3 minutes alternating between nostrils in event of narcotic overdose  oxyCODONE 5 mg oral tablet: 1 tab(s) orally every 4 hours as needed for Moderate Pain (4 - 6) ; 2 Tabs PO Q4H PRN Severe Pain MDD: 6  pantoprazole 40 mg oral delayed release tablet: 1 tab(s) orally once a day before breakfast for gastrointestinal protection MDD: 1  PreserVision AREDS 2 oral capsule: orally 2 times a day  Prolia 60 mg/mL subcutaneous solution: 60 solution subcutaneously every 6 months last dose in May 2023. Post surgery resume this medication as instructed by your rheumatologist  senna leaf extract oral tablet: 2 tab(s) orally once a day (at bedtime) to prevent constipation while taking narcotics for pain control (Stop if having diarrhea) MDD: 1  traMADol 50 mg oral tablet: 1 tab(s) orally every 6 hours as needed for Mild Pain (1 - 3) MDD: 4

## 2023-08-31 NOTE — PHYSICAL THERAPY INITIAL EVALUATION ADULT - ADL SKILLS, REHAB EVAL
Impression: Age-related nuclear cataract, bilateral: H25.13. Plan: (( (( First eye (OD then OS) DISTANCE AIM -0.25 OU: +  DEXYCU, + Block, ORA if patient desires, LenSx OU)) [address] Discussed cataract diagnosis with the patient. Appropriate testing ordered for cataract diagnosis prior to Preop. Risks and benefits of surgical treatment were discussed and understood. Patient desires surgical treatment. Discussed lens options with pt and pt is ok with wearing glasses after surgery. Patient desires surgery to proceed with surgery OD THEN OS. Both eyes examined, medically necessary due to impact in activities of daily living. Discussed higher risks with smaller pupil and discussed iris stretch and higher risks of bleeding.  Discussed there is a chance of developing capsular haze after surgery, which may be corrected with laser/yag
independent/needed assist

## 2023-08-31 NOTE — CHART NOTE - NSCHARTNOTEFT_GEN_A_CORE
Resting without complaints.  No Chest Pain, SOB, N/V.    T(C): 36.3 (08-31-23 @ 13:30), Max: 36.9 (08-31-23 @ 08:20)  HR: 75 (08-31-23 @ 15:00) (65 - 88)  BP: 112/55 (08-31-23 @ 15:00) (102/59 - 157/85)  RR: 16 (08-31-23 @ 15:00) (15 - 18)  SpO2: 100% (08-31-23 @ 15:00) (96% - 100%)      Exam:  Alert and [place], No Acute Distress  Card: +S1/S2, RRR  Pulm: CTAB  Laterality: L Hip  Hip Abduction Pillow in place  Aquacel c/d/i  Calves soft, non-tender bilaterally  Unable to assess motor/sensory response at this time 2/2 spinal nerve block still in effect.  + DP pulse    Xray: S/P L Total Hip Arthroplasty, prosthesis in place and intact with no signs of sukhjinder-prosthetic Fx.  Acetabular screw in place and intact.             A/P: 72y Female S/p L Total Hip Arthroplasty. VSS. NAD  -PT/OT-WBAT LLE With Posterior Hip Precautions  -FU AM Labs tomorrow  -Assess for motor/sensory return once spinal nerve block wears off.  -IS  -DVT PPx: ASA 325mg PO BID, SCDs, early OOB and ambulation.  -Pain Control  -Continue Current Tx  -Dispo planning    Jose Ramon Tapia PA-C  Orthopedic Surgery Team  Team Pager #[phone]

## 2023-08-31 NOTE — PHYSICAL THERAPY INITIAL EVALUATION ADULT - DIAGNOSIS, PT EVAL
Medication: Wegovy  Dosage:0.25mg/0.5ml  Quantity requested:  2 ml  Pharmacy for prescription has been selected.    Please re-submit a prior auth for the Wegovy.  Pt is afraid of needles and cannot administer the Saxenda   Decreased functional mobility

## 2023-08-31 NOTE — DISCHARGE NOTE PROVIDER - NSDCFUSCHEDAPPT_GEN_ALL_CORE_FT
[facility]  ORTHOSURG [address] [place]  Scheduled Appointment: 09/20/2023

## 2023-08-31 NOTE — PHYSICAL THERAPY INITIAL EVALUATION ADULT - GENERAL OBSERVATIONS, REHAB EVAL
Pt rec'd semisupine in bed, in NAD, +IV, +abduction pillow. Agreeable to PT. RN cleared pt to be seen.

## 2023-08-31 NOTE — DISCHARGE NOTE PROVIDER - NSDCCPCAREPLAN_GEN_ALL_CORE_FT
PRINCIPAL DISCHARGE DIAGNOSIS  Diagnosis: Unilateral primary osteoarthritis, left hip  Assessment and Plan of Treatment:

## 2023-08-31 NOTE — PHYSICAL THERAPY INITIAL EVALUATION ADULT - GAIT DEVIATIONS NOTED, PT EVAL
decreased shilo/increased time in double stance/decreased step length/decreased stride length/decreased weight-shifting ability

## 2023-08-31 NOTE — DISCHARGE NOTE PROVIDER - NSDCFUADDINST_GEN_ALL_CORE_FT
Please call to schedule your post-operative follow up appointment with Dr. Vargas for 2 weeks after hospital discharge.   Keep dressing clean, dry, and intact. Have doctor remove bandage at your post-operative follow up appointment.   Shower: with assistance. keep the dressing away from the stream of water. Pat the dressing dry when coming out of the shower. no tub baths.   Continue to ambulate as tolerated with a rolling walker.   Continue to take Aspirin 325 mg twice daily x 6 weeks to help prevent blood clots. Please continue taking Protonix 40mg daily while taking aspirin for gastrointestinal protection.   Recommended to follow up with your primary care provider within 1-2 months of hospital discharge to discuss your recent surgery and any change to your medications.    Please followup with Dr. Vargas on 9/20/23; call to confirm appointment.  Keep dressing clean, dry, and intact. Have doctor remove bandage at your post-operative follow up appointment.   Shower: with assistance. keep the dressing away from the stream of water. Pat the dressing dry when coming out of the shower. no tub baths.   Continue to ambulate as tolerated with a rolling walker.   Continue to take Aspirin 325 mg twice daily x 6 weeks to help prevent blood clots. Please continue taking Protonix 40mg daily while taking aspirin for gastrointestinal protection.   Recommended to follow up with your primary care provider within 1-2 months of hospital discharge to discuss your recent surgery and any change to your medications.

## 2023-08-31 NOTE — PHYSICAL THERAPY INITIAL EVALUATION ADULT - ADDITIONAL COMMENTS
Pt lives in a house with  (can assist). Has 7 stairs to enter (+HR) and 7+6 stairs inside (+HR). Reports being independent with functional mobility/ADLs without AD. Reports  occasionally assists with ADLs 2/2 chronic b/l hand arthritis and +ulnar deviation. Owns shower chair and has high toilet seats at home. +drive.

## 2023-08-31 NOTE — PRE-ANESTHESIA EVALUATION ADULT - NSANTHBPHIGHRD_ENT_A_CORE
Prior Authorization Form:      DEMOGRAPHICS:                     Patient Name:  Norah Denton  Patient :  1951            Insurance:  Payor: / No coverage found. Insurance ID Number:    Payer/Plan Subscr  Sex Relation Sub.  Ins. ID Effective Group Num         DIAGNOSIS & PROCEDURE:                       Procedure/Operation: mediport insertion           CPT Code: [id number]       Diagnosis:  pancreatic cancer    ICD10 Code: c25.9    Location:  [facility]    Surgeon:  Vero Uribe    SCHEDULING INFORMATION:                          Date: 10/20/2022    Time: 8:00 am              Anesthesia:  [facility]                                                     Status:  outpatient        Special Comments:  patient is self pay       Electronically signed by Jim Treviño MA on 10/12/2022 at 12:06 PM
Yes

## 2023-08-31 NOTE — DISCHARGE NOTE PROVIDER - YES NO FOR MLM POSITIVE OR NEGATIVE COVID RESULT
Detail Level: Detailed
Quality 130: Documentation Of Current Medications In The Medical Record: Current Medications Documented
Quality 402: Tobacco Use And Help With Quitting Among Adolescents: Patient screened for tobacco and never smoked
,

## 2023-09-01 LAB
ANION GAP SERPL CALC-SCNC: 12 MMOL/L — SIGNIFICANT CHANGE UP (ref 5–17)
BUN SERPL-MCNC: 12 MG/DL — SIGNIFICANT CHANGE UP (ref 7–23)
CALCIUM SERPL-MCNC: 8.2 MG/DL — LOW (ref 8.4–10.5)
CHLORIDE SERPL-SCNC: 103 MMOL/L — SIGNIFICANT CHANGE UP (ref 96–108)
CO2 SERPL-SCNC: 23 MMOL/L — SIGNIFICANT CHANGE UP (ref 22–31)
CREAT SERPL-MCNC: 0.66 MG/DL — SIGNIFICANT CHANGE UP (ref 0.5–1.3)
EGFR: 92 ML/MIN/1.73M2 — SIGNIFICANT CHANGE UP
GLUCOSE BLDC GLUCOMTR-MCNC: 136 MG/DL — HIGH (ref 70–99)
GLUCOSE BLDC GLUCOMTR-MCNC: 137 MG/DL — HIGH (ref 70–99)
GLUCOSE BLDC GLUCOMTR-MCNC: 140 MG/DL — HIGH (ref 70–99)
GLUCOSE BLDC GLUCOMTR-MCNC: 140 MG/DL — HIGH (ref 70–99)
GLUCOSE SERPL-MCNC: 136 MG/DL — HIGH (ref 70–99)
HCT VFR BLD CALC: 36.7 % — SIGNIFICANT CHANGE UP (ref 34.5–45)
HGB BLD-MCNC: 12.1 G/DL — SIGNIFICANT CHANGE UP (ref 11.5–15.5)
MCHC RBC-ENTMCNC: 30.2 PG — SIGNIFICANT CHANGE UP (ref 27–34)
MCHC RBC-ENTMCNC: 33 GM/DL — SIGNIFICANT CHANGE UP (ref 32–36)
MCV RBC AUTO: 91.5 FL — SIGNIFICANT CHANGE UP (ref 80–100)
NRBC # BLD: 0 /100 WBCS — SIGNIFICANT CHANGE UP (ref 0–0)
PLATELET # BLD AUTO: 221 K/UL — SIGNIFICANT CHANGE UP (ref 150–400)
POTASSIUM SERPL-MCNC: 4.4 MMOL/L — SIGNIFICANT CHANGE UP (ref 3.5–5.3)
POTASSIUM SERPL-SCNC: 4.4 MMOL/L — SIGNIFICANT CHANGE UP (ref 3.5–5.3)
RBC # BLD: 4.01 M/UL — SIGNIFICANT CHANGE UP (ref 3.8–5.2)
RBC # FLD: 13.7 % — SIGNIFICANT CHANGE UP (ref 10.3–14.5)
SODIUM SERPL-SCNC: 138 MMOL/L — SIGNIFICANT CHANGE UP (ref 135–145)
WBC # BLD: 10.35 K/UL — SIGNIFICANT CHANGE UP (ref 3.8–10.5)
WBC # FLD AUTO: 10.35 K/UL — SIGNIFICANT CHANGE UP (ref 3.8–10.5)

## 2023-09-01 RX ORDER — LANOLIN ALCOHOL/MO/W.PET/CERES
5 CREAM (GRAM) TOPICAL AT BEDTIME
Refills: 0 | Status: DISCONTINUED | OUTPATIENT
Start: 2023-09-01 | End: 2023-09-02

## 2023-09-01 RX ADMIN — Medication 975 MILLIGRAM(S): at 21:06

## 2023-09-01 RX ADMIN — Medication 15 MILLIGRAM(S): at 05:13

## 2023-09-01 RX ADMIN — OXYCODONE HYDROCHLORIDE 10 MILLIGRAM(S): 5 TABLET ORAL at 10:13

## 2023-09-01 RX ADMIN — CELECOXIB 200 MILLIGRAM(S): 200 CAPSULE ORAL at 17:21

## 2023-09-01 RX ADMIN — OXYCODONE HYDROCHLORIDE 10 MILLIGRAM(S): 5 TABLET ORAL at 22:21

## 2023-09-01 RX ADMIN — LOSARTAN POTASSIUM 50 MILLIGRAM(S): 100 TABLET, FILM COATED ORAL at 05:13

## 2023-09-01 RX ADMIN — Medication 400 MILLIGRAM(S): at 05:12

## 2023-09-01 RX ADMIN — Medication 400 MILLIGRAM(S): at 13:22

## 2023-09-01 RX ADMIN — Medication 975 MILLIGRAM(S): at 20:06

## 2023-09-01 RX ADMIN — Medication 120 MILLIGRAM(S): at 17:23

## 2023-09-01 RX ADMIN — OXYCODONE HYDROCHLORIDE 10 MILLIGRAM(S): 5 TABLET ORAL at 09:13

## 2023-09-01 RX ADMIN — SENNA PLUS 2 TABLET(S): 8.6 TABLET ORAL at 21:16

## 2023-09-01 RX ADMIN — Medication 1000 MILLIGRAM(S): at 05:41

## 2023-09-01 RX ADMIN — Medication 325 MILLIGRAM(S): at 05:13

## 2023-09-01 RX ADMIN — SODIUM CHLORIDE 500 MILLILITER(S): 9 INJECTION INTRAMUSCULAR; INTRAVENOUS; SUBCUTANEOUS at 05:15

## 2023-09-01 RX ADMIN — Medication 15 MILLIGRAM(S): at 05:42

## 2023-09-01 RX ADMIN — ATORVASTATIN CALCIUM 10 MILLIGRAM(S): 80 TABLET, FILM COATED ORAL at 21:16

## 2023-09-01 RX ADMIN — Medication 15 MILLIGRAM(S): at 12:17

## 2023-09-01 RX ADMIN — Medication 240 MILLIGRAM(S): at 06:24

## 2023-09-01 RX ADMIN — Medication 15 MILLIGRAM(S): at 11:47

## 2023-09-01 RX ADMIN — Medication 5 MILLIGRAM(S): at 00:27

## 2023-09-01 RX ADMIN — Medication 100 MILLIGRAM(S): at 05:12

## 2023-09-01 RX ADMIN — Medication 1000 MILLIGRAM(S): at 13:52

## 2023-09-01 RX ADMIN — POLYETHYLENE GLYCOL 3350 17 GRAM(S): 17 POWDER, FOR SOLUTION ORAL at 21:21

## 2023-09-01 RX ADMIN — OXYCODONE HYDROCHLORIDE 10 MILLIGRAM(S): 5 TABLET ORAL at 21:21

## 2023-09-01 RX ADMIN — Medication 325 MILLIGRAM(S): at 17:22

## 2023-09-01 RX ADMIN — PANTOPRAZOLE SODIUM 40 MILLIGRAM(S): 20 TABLET, DELAYED RELEASE ORAL at 05:12

## 2023-09-01 NOTE — OCCUPATIONAL THERAPY INITIAL EVALUATION ADULT - PERTINENT HX OF CURRENT PROBLEM, REHAB EVAL
72 year female with complaints of left hip pain x 6 months, worse over the last 2 months, with insidious onset and presents s/p Left Total Hip Replacement Posterior on 8/31/23.

## 2023-09-02 ENCOUNTER — TRANSCRIPTION ENCOUNTER (OUTPATIENT)
Age: 73
End: 2023-09-02

## 2023-09-02 VITALS
DIASTOLIC BLOOD PRESSURE: 62 MMHG | HEART RATE: 77 BPM | SYSTOLIC BLOOD PRESSURE: 99 MMHG | TEMPERATURE: 98 F | OXYGEN SATURATION: 96 % | RESPIRATION RATE: 18 BRPM

## 2023-09-02 LAB
GLUCOSE BLDC GLUCOMTR-MCNC: 106 MG/DL — HIGH (ref 70–99)
GLUCOSE BLDC GLUCOMTR-MCNC: 145 MG/DL — HIGH (ref 70–99)

## 2023-09-02 PROCEDURE — 80048 BASIC METABOLIC PNL TOTAL CA: CPT

## 2023-09-02 PROCEDURE — 72170 X-RAY EXAM OF PELVIS: CPT

## 2023-09-02 PROCEDURE — 36415 COLL VENOUS BLD VENIPUNCTURE: CPT

## 2023-09-02 PROCEDURE — 82962 GLUCOSE BLOOD TEST: CPT

## 2023-09-02 PROCEDURE — 97530 THERAPEUTIC ACTIVITIES: CPT

## 2023-09-02 PROCEDURE — C1776: CPT

## 2023-09-02 PROCEDURE — 73501 X-RAY EXAM HIP UNI 1 VIEW: CPT

## 2023-09-02 PROCEDURE — 97161 PT EVAL LOW COMPLEX 20 MIN: CPT

## 2023-09-02 PROCEDURE — 97116 GAIT TRAINING THERAPY: CPT

## 2023-09-02 PROCEDURE — 97165 OT EVAL LOW COMPLEX 30 MIN: CPT

## 2023-09-02 PROCEDURE — C1713: CPT

## 2023-09-02 PROCEDURE — 85027 COMPLETE CBC AUTOMATED: CPT

## 2023-09-02 RX ORDER — PANTOPRAZOLE SODIUM 20 MG/1
1 TABLET, DELAYED RELEASE ORAL
Qty: 42 | Refills: 0
Start: 2023-09-02 | End: 2023-10-13

## 2023-09-02 RX ORDER — OXYCODONE HYDROCHLORIDE 5 MG/1
1 TABLET ORAL
Qty: 42 | Refills: 0
Start: 2023-09-02 | End: 2023-09-08

## 2023-09-02 RX ORDER — NALOXONE HYDROCHLORIDE 4 MG/.1ML
4 SPRAY NASAL
Qty: 1 | Refills: 0
Start: 2023-09-02

## 2023-09-02 RX ORDER — SENNA PLUS 8.6 MG/1
2 TABLET ORAL
Qty: 60 | Refills: 0
Start: 2023-09-02 | End: 2023-10-01

## 2023-09-02 RX ORDER — ACETAMINOPHEN 500 MG
3 TABLET ORAL
Qty: 0 | Refills: 0 | DISCHARGE
Start: 2023-09-02

## 2023-09-02 RX ORDER — TRAMADOL HYDROCHLORIDE 50 MG/1
1 TABLET ORAL
Qty: 28 | Refills: 0
Start: 2023-09-02 | End: 2023-09-08

## 2023-09-02 RX ORDER — ASPIRIN/CALCIUM CARB/MAGNESIUM 324 MG
1 TABLET ORAL
Qty: 84 | Refills: 0
Start: 2023-09-02 | End: 2023-10-13

## 2023-09-02 RX ADMIN — CELECOXIB 200 MILLIGRAM(S): 200 CAPSULE ORAL at 05:52

## 2023-09-02 RX ADMIN — Medication 975 MILLIGRAM(S): at 05:52

## 2023-09-02 RX ADMIN — Medication 975 MILLIGRAM(S): at 13:00

## 2023-09-02 RX ADMIN — PANTOPRAZOLE SODIUM 40 MILLIGRAM(S): 20 TABLET, DELAYED RELEASE ORAL at 05:01

## 2023-09-02 RX ADMIN — Medication 975 MILLIGRAM(S): at 05:02

## 2023-09-02 RX ADMIN — CELECOXIB 200 MILLIGRAM(S): 200 CAPSULE ORAL at 05:02

## 2023-09-02 RX ADMIN — Medication 975 MILLIGRAM(S): at 12:00

## 2023-09-02 RX ADMIN — LOSARTAN POTASSIUM 50 MILLIGRAM(S): 100 TABLET, FILM COATED ORAL at 05:02

## 2023-09-02 RX ADMIN — Medication 240 MILLIGRAM(S): at 06:01

## 2023-09-02 RX ADMIN — Medication 325 MILLIGRAM(S): at 05:02

## 2023-09-02 NOTE — PROGRESS NOTE ADULT - SUBJECTIVE AND OBJECTIVE BOX
Patient resting without complaints.  No chest pain, SOB, N/V.    T(C): 36.4 (09-02-23 @ 04:45), Max: 36.8 (09-01-23 @ 16:00)  HR: 77 (09-02-23 @ 04:45) (67 - 90)  BP: 123/74 (09-02-23 @ 04:45) (101/60 - 152/79)  RR: 18 (09-02-23 @ 04:45) (18 - 18)  SpO2: 94% (09-02-23 @ 04:45) (94% - 98%)      Exam:  Alert and Oriented, No Acute Distress  LLE: Hip Aquacel C/D/I, ABduction pillow in place, compartments soft, dull sensation grossly intact, DP2+, +DF/PF     Labs:                        12.1   10.35 )-----------( 221      ( 01 Sep 2023 06:15 )             36.7    09-01    138  |  103  |  12  ----------------------------<  136<H>  4.4   |  23  |  0.66    Ca    8.2<L>      01 Sep 2023 06:15    
Patient is a 72y old  Female who presents with a chief complaint of Left hip OA  Patient s/p left total hip arthroplasty POD#1  Patient comfortable  No complaints    T(C): 36.4 (09-01-23 @ 04:13), Max: 37 (08-31-23 @ 18:15)  HR: 75 (09-01-23 @ 04:13) (65 - 88)  BP: 123/63 (09-01-23 @ 04:13) (100/55 - 157/85)  RR: 18 (09-01-23 @ 04:13) (15 - 18)  SpO2: 94% (09-01-23 @ 04:13) (94% - 100%)    PHYSICAL EXAM:  NAD, Alert  [ Left] Hip:Aquacel Dressing C/D/I; sensation grossly intact (+) DF/PF; (+) Distal Pulses; No Calf tenderness B/L, PAS     LABS:                        12.1   10.35 )-----------( 221      ( 01 Sep 2023 06:15 )             36.7     09-01  138  |  103  |  12  ----------------------------<  136<H>  4.4   |  23  |  0.66  Ca    8.2<L>      01 Sep 2023 06:15

## 2023-09-02 NOTE — DISCHARGE NOTE NURSING/CASE MANAGEMENT/SOCIAL WORK - PATIENT PORTAL LINK FT
You can access the FollowMyHealth Patient Portal offered by [facility] by registering at the following website: [email or web address]. By joining [facility]’s FollowMyHealth portal, you will also be able to view your health information using other applications (apps) compatible with our system.

## 2023-09-02 NOTE — PROGRESS NOTE ADULT - ASSESSMENT
A/P: 73 y/o F POD# 2 s/p L UCHE    DVT ppx- ASA 325mg PO BID  LLE WBAT/Post hip precautions  Abduction pillow in bed  PT  OT  Pain management prn  Gi ppx  Discharge planning to Home p PT clearance  Above D/W attending        AYO Chaparro  Orthopedic Surgery  [id number]  
71 y/o FM s/p left total hip arthroplasty POD#1 physical therapy, posterior precautions  Anna Bai PA-C  Orthopaedic Surgery  Team pager [phone]  [facility] [phone]  [phone]

## 2023-09-02 NOTE — DISCHARGE NOTE NURSING/CASE MANAGEMENT/SOCIAL WORK - NSDCPEFALRISK_GEN_ALL_CORE
For information on Fall & Injury Prevention, visit: [email or web address] OR  [email or web address] OR  https://www.cdc.gov/steadi/patient.html

## 2023-09-03 ENCOUNTER — TRANSCRIPTION ENCOUNTER (OUTPATIENT)
Age: 73
End: 2023-09-03

## 2023-09-05 ENCOUNTER — TRANSCRIPTION ENCOUNTER (OUTPATIENT)
Age: 73
End: 2023-09-05

## 2023-09-07 ENCOUNTER — NON-APPOINTMENT (OUTPATIENT)
Age: 73
End: 2023-09-07

## 2023-09-10 ENCOUNTER — TRANSCRIPTION ENCOUNTER (OUTPATIENT)
Age: 73
End: 2023-09-10

## 2023-09-11 ENCOUNTER — NON-APPOINTMENT (OUTPATIENT)
Age: 73
End: 2023-09-11

## 2023-09-12 ENCOUNTER — TRANSCRIPTION ENCOUNTER (OUTPATIENT)
Age: 73
End: 2023-09-12

## 2023-09-20 ENCOUNTER — APPOINTMENT (OUTPATIENT)
Dept: ORTHOPEDIC SURGERY | Facility: CLINIC | Age: 73
End: 2023-09-20
Payer: MEDICARE

## 2023-09-20 VITALS
SYSTOLIC BLOOD PRESSURE: 131 MMHG | HEIGHT: 61 IN | HEART RATE: 81 BPM | DIASTOLIC BLOOD PRESSURE: 73 MMHG | BODY MASS INDEX: 24.73 KG/M2 | WEIGHT: 131 LBS

## 2023-09-20 PROBLEM — I10 ESSENTIAL (PRIMARY) HYPERTENSION: Chronic | Status: ACTIVE | Noted: 2023-08-17

## 2023-09-20 PROBLEM — E78.5 HYPERLIPIDEMIA, UNSPECIFIED: Chronic | Status: ACTIVE | Noted: 2023-08-17

## 2023-09-20 PROBLEM — M16.12 UNILATERAL PRIMARY OSTEOARTHRITIS, LEFT HIP: Chronic | Status: ACTIVE | Noted: 2023-08-17

## 2023-09-20 PROBLEM — U07.1 COVID-19: Chronic | Status: ACTIVE | Noted: 2023-08-17

## 2023-09-20 PROBLEM — L40.50 ARTHROPATHIC PSORIASIS, UNSPECIFIED: Chronic | Status: ACTIVE | Noted: 2023-08-17

## 2023-09-20 PROCEDURE — 73502 X-RAY EXAM HIP UNI 2-3 VIEWS: CPT

## 2023-09-20 PROCEDURE — 99024 POSTOP FOLLOW-UP VISIT: CPT

## 2023-09-22 ENCOUNTER — TRANSCRIPTION ENCOUNTER (OUTPATIENT)
Age: 73
End: 2023-09-22

## 2023-10-04 ENCOUNTER — TRANSCRIPTION ENCOUNTER (OUTPATIENT)
Age: 73
End: 2023-10-04

## 2023-10-20 ENCOUNTER — TRANSCRIPTION ENCOUNTER (OUTPATIENT)
Age: 73
End: 2023-10-20

## 2023-11-01 ENCOUNTER — APPOINTMENT (OUTPATIENT)
Dept: ORTHOPEDIC SURGERY | Facility: CLINIC | Age: 73
End: 2023-11-01
Payer: MEDICARE

## 2023-11-01 VITALS
WEIGHT: 131 LBS | HEART RATE: 61 BPM | DIASTOLIC BLOOD PRESSURE: 75 MMHG | SYSTOLIC BLOOD PRESSURE: 149 MMHG | HEIGHT: 61 IN | BODY MASS INDEX: 24.73 KG/M2

## 2023-11-01 PROCEDURE — 73502 X-RAY EXAM HIP UNI 2-3 VIEWS: CPT | Mod: LT

## 2023-11-01 PROCEDURE — 99024 POSTOP FOLLOW-UP VISIT: CPT

## 2023-11-01 RX ORDER — AMOXICILLIN 500 MG/1
500 CAPSULE ORAL
Qty: 4 | Refills: 0 | Status: ACTIVE | COMMUNITY
Start: 2023-11-01 | End: 1900-01-01

## 2023-11-09 ENCOUNTER — TRANSCRIPTION ENCOUNTER (OUTPATIENT)
Age: 73
End: 2023-11-09

## 2023-12-05 NOTE — INPATIENT CERTIFICATION FOR MEDICARE PATIENTS - NS ICMP TWO DAYS INPATIENT
FOLLOW-UP VISIT     Mr. Meredith is 3 years out from his right knee replacement which is done well.  Unfortunately he is had longstanding polyarthropathy and this particularly the affecting now his hips.  He recently had efforts that injection of his hips last summer and early fall with Dr. White.  This proved ineffective particularly on the left hip.  He has almost constant pain regarding the left hip.  He is severely disabled related the lack of mobility of either hip.  Not aware of a history of crystalline arthropathy.  He does not take currently medications for arthritis.  He is using a cane now for routine ambulation as he can not trust the left hip.    OBJECTIVE:  Clinical review shows Visit Vitals  /68   Pulse 71   Resp 18   Ht 6' 1\" (1.854 m)   Wt 92.1 kg (203 lb)   SpO2 99%   BMI 26.78 kg/m²     Clinical review identifies a thin healthy-appearing gentleman stated age in no acute distress at rest.  He has almost no tolerable hip rotation on either hip and is unable to flex his hips beyond 70° without crepitus and pain.  There is slight external rotation to the left hip.  He has no hip flexion capacity on the left he does still flex against resistance on the right.  Abductor function is intact bilaterally.  Once in an upright position he can walk with mild antalgia but tolerable he has great difficulty however transitioning from the chair to the standing position.  The right knee shows a well-healed incision active knee extension is full knee flexion is to 110° no collateral laxity is noted.    Radiographic review shows severe bilateral hip arthropathy with near ankylosis of the hip joints bilaterally.  This is significantly progressed since 2021    IMPRESSION:  Severe bilateral hip degenerative joint disease with likely seronegative component.  Now with near ankylosis    PLAN:  Unfortunately Jimmy has come to a point where neither hip has any significant functional capacity.  The left in particular  causes marked pain for him.  Hip replacement unfortunately the only expected any that it is going to provide him with clinical benefit at this stage.  It has been reviewed and explained in detail to Jimmy Meredith potential risks and complications in hip replacement surgery including but not limited to infection, deep venous thrombosis or pulmonary embolus, dislocation, fracture, leg length discrepancy, neurovascular risks, or untoward medical outcomes, including morbidity and mortality, among other risks. Consent has been given recognizing these potentialities of surgery.  He is a good candidate for the direct anterior approach and would prefer to get after his lwft hip sooner rather an later given the pain he is dealing with on the left.  I suspect he will likely have to consider proceeding on the right in a short period of time thereafter given how severely involved he is.             Yes

## 2023-12-10 ENCOUNTER — NON-APPOINTMENT (OUTPATIENT)
Age: 73
End: 2023-12-10

## 2024-02-08 ENCOUNTER — EMERGENCY (EMERGENCY)
Facility: HOSPITAL | Age: 74
LOS: 1 days | Discharge: HOME | End: 2024-02-08
Attending: EMERGENCY MEDICINE
Payer: MEDICARE

## 2024-02-08 VITALS
DIASTOLIC BLOOD PRESSURE: 80 MMHG | HEART RATE: 87 BPM | TEMPERATURE: 98 F | RESPIRATION RATE: 16 BRPM | OXYGEN SATURATION: 94 % | SYSTOLIC BLOOD PRESSURE: 148 MMHG

## 2024-02-08 VITALS
OXYGEN SATURATION: 96 % | HEIGHT: 61 IN | SYSTOLIC BLOOD PRESSURE: 179 MMHG | WEIGHT: 132.94 LBS | RESPIRATION RATE: 20 BRPM | TEMPERATURE: 99 F | HEART RATE: 88 BPM | DIASTOLIC BLOOD PRESSURE: 90 MMHG

## 2024-02-08 DIAGNOSIS — Z98.890 OTHER SPECIFIED POSTPROCEDURAL STATES: Chronic | ICD-10-CM

## 2024-02-08 DIAGNOSIS — Z90.49 ACQUIRED ABSENCE OF OTHER SPECIFIED PARTS OF DIGESTIVE TRACT: Chronic | ICD-10-CM

## 2024-02-08 LAB
ALBUMIN SERPL ELPH-MCNC: 4 G/DL — SIGNIFICANT CHANGE UP (ref 3.3–5)
ALBUMIN SERPL ELPH-MCNC: 4.1 G/DL — SIGNIFICANT CHANGE UP (ref 3.3–5)
ALP SERPL-CCNC: 289 U/L — HIGH (ref 40–120)
ALP SERPL-CCNC: 302 U/L — HIGH (ref 40–120)
ALT FLD-CCNC: 242 U/L — HIGH (ref 10–45)
ALT FLD-CCNC: 353 U/L — HIGH (ref 10–45)
ANION GAP SERPL CALC-SCNC: 11 MMOL/L — SIGNIFICANT CHANGE UP (ref 5–17)
ANION GAP SERPL CALC-SCNC: 15 MMOL/L — SIGNIFICANT CHANGE UP (ref 5–17)
APTT BLD: 27.9 SEC — SIGNIFICANT CHANGE UP (ref 24.5–35.6)
AST SERPL-CCNC: 504 U/L — HIGH (ref 10–40)
AST SERPL-CCNC: 537 U/L — HIGH (ref 10–40)
BASOPHILS # BLD AUTO: 0.03 K/UL — SIGNIFICANT CHANGE UP (ref 0–0.2)
BASOPHILS NFR BLD AUTO: 0.2 % — SIGNIFICANT CHANGE UP (ref 0–2)
BILIRUB SERPL-MCNC: 0.3 MG/DL — SIGNIFICANT CHANGE UP (ref 0.2–1.2)
BILIRUB SERPL-MCNC: 0.6 MG/DL — SIGNIFICANT CHANGE UP (ref 0.2–1.2)
BUN SERPL-MCNC: 21 MG/DL — SIGNIFICANT CHANGE UP (ref 7–23)
BUN SERPL-MCNC: 25 MG/DL — HIGH (ref 7–23)
CALCIUM SERPL-MCNC: 8.6 MG/DL — SIGNIFICANT CHANGE UP (ref 8.4–10.5)
CALCIUM SERPL-MCNC: 9.6 MG/DL — SIGNIFICANT CHANGE UP (ref 8.4–10.5)
CHLORIDE SERPL-SCNC: 104 MMOL/L — SIGNIFICANT CHANGE UP (ref 96–108)
CHLORIDE SERPL-SCNC: 109 MMOL/L — HIGH (ref 96–108)
CO2 SERPL-SCNC: 19 MMOL/L — LOW (ref 22–31)
CO2 SERPL-SCNC: 20 MMOL/L — LOW (ref 22–31)
CREAT SERPL-MCNC: 0.67 MG/DL — SIGNIFICANT CHANGE UP (ref 0.5–1.3)
CREAT SERPL-MCNC: 0.75 MG/DL — SIGNIFICANT CHANGE UP (ref 0.5–1.3)
EGFR: 83 ML/MIN/1.73M2 — SIGNIFICANT CHANGE UP
EGFR: 91 ML/MIN/1.73M2 — SIGNIFICANT CHANGE UP
EOSINOPHIL # BLD AUTO: 0.01 K/UL — SIGNIFICANT CHANGE UP (ref 0–0.5)
EOSINOPHIL NFR BLD AUTO: 0.1 % — SIGNIFICANT CHANGE UP (ref 0–6)
FLUAV AG NPH QL: SIGNIFICANT CHANGE UP
FLUBV AG NPH QL: SIGNIFICANT CHANGE UP
GLUCOSE SERPL-MCNC: 124 MG/DL — HIGH (ref 70–99)
GLUCOSE SERPL-MCNC: 160 MG/DL — HIGH (ref 70–99)
HCT VFR BLD CALC: 39.3 % — SIGNIFICANT CHANGE UP (ref 34.5–45)
HGB BLD-MCNC: 13 G/DL — SIGNIFICANT CHANGE UP (ref 11.5–15.5)
IMM GRANULOCYTES NFR BLD AUTO: 0.4 % — SIGNIFICANT CHANGE UP (ref 0–0.9)
INR BLD: 1.01 RATIO — SIGNIFICANT CHANGE UP (ref 0.85–1.18)
LYMPHOCYTES # BLD AUTO: 0.84 K/UL — LOW (ref 1–3.3)
LYMPHOCYTES # BLD AUTO: 6 % — LOW (ref 13–44)
MCHC RBC-ENTMCNC: 28.4 PG — SIGNIFICANT CHANGE UP (ref 27–34)
MCHC RBC-ENTMCNC: 33.1 GM/DL — SIGNIFICANT CHANGE UP (ref 32–36)
MCV RBC AUTO: 86 FL — SIGNIFICANT CHANGE UP (ref 80–100)
MONOCYTES # BLD AUTO: 0.78 K/UL — SIGNIFICANT CHANGE UP (ref 0–0.9)
MONOCYTES NFR BLD AUTO: 5.5 % — SIGNIFICANT CHANGE UP (ref 2–14)
NEUTROPHILS # BLD AUTO: 12.37 K/UL — HIGH (ref 1.8–7.4)
NEUTROPHILS NFR BLD AUTO: 87.8 % — HIGH (ref 43–77)
NRBC # BLD: 0 /100 WBCS — SIGNIFICANT CHANGE UP (ref 0–0)
PLATELET # BLD AUTO: 202 K/UL — SIGNIFICANT CHANGE UP (ref 150–400)
POTASSIUM SERPL-MCNC: 4 MMOL/L — SIGNIFICANT CHANGE UP (ref 3.5–5.3)
POTASSIUM SERPL-MCNC: 4.2 MMOL/L — SIGNIFICANT CHANGE UP (ref 3.5–5.3)
POTASSIUM SERPL-SCNC: 4 MMOL/L — SIGNIFICANT CHANGE UP (ref 3.5–5.3)
POTASSIUM SERPL-SCNC: 4.2 MMOL/L — SIGNIFICANT CHANGE UP (ref 3.5–5.3)
PROT SERPL-MCNC: 6.4 G/DL — SIGNIFICANT CHANGE UP (ref 6–8.3)
PROT SERPL-MCNC: 7.3 G/DL — SIGNIFICANT CHANGE UP (ref 6–8.3)
PROTHROM AB SERPL-ACNC: 10.6 SEC — SIGNIFICANT CHANGE UP (ref 9.5–13)
RBC # BLD: 4.57 M/UL — SIGNIFICANT CHANGE UP (ref 3.8–5.2)
RBC # FLD: 13.6 % — SIGNIFICANT CHANGE UP (ref 10.3–14.5)
RSV RNA NPH QL NAA+NON-PROBE: SIGNIFICANT CHANGE UP
SARS-COV-2 RNA SPEC QL NAA+PROBE: DETECTED
SODIUM SERPL-SCNC: 138 MMOL/L — SIGNIFICANT CHANGE UP (ref 135–145)
SODIUM SERPL-SCNC: 140 MMOL/L — SIGNIFICANT CHANGE UP (ref 135–145)
WBC # BLD: 14.09 K/UL — HIGH (ref 3.8–10.5)
WBC # FLD AUTO: 14.09 K/UL — HIGH (ref 3.8–10.5)

## 2024-02-08 PROCEDURE — 99152 MOD SED SAME PHYS/QHP 5/>YRS: CPT

## 2024-02-08 PROCEDURE — 96374 THER/PROPH/DIAG INJ IV PUSH: CPT | Mod: XU

## 2024-02-08 PROCEDURE — 86664 EPSTEIN-BARR NUCLEAR ANTIGEN: CPT

## 2024-02-08 PROCEDURE — 85610 PROTHROMBIN TIME: CPT

## 2024-02-08 PROCEDURE — 86850 RBC ANTIBODY SCREEN: CPT

## 2024-02-08 PROCEDURE — 86665 EPSTEIN-BARR CAPSID VCA: CPT

## 2024-02-08 PROCEDURE — 73501 X-RAY EXAM HIP UNI 1 VIEW: CPT

## 2024-02-08 PROCEDURE — 73552 X-RAY EXAM OF FEMUR 2/>: CPT

## 2024-02-08 PROCEDURE — 73562 X-RAY EXAM OF KNEE 3: CPT | Mod: 26,LT

## 2024-02-08 PROCEDURE — 73501 X-RAY EXAM HIP UNI 1 VIEW: CPT | Mod: 26,XU

## 2024-02-08 PROCEDURE — 73502 X-RAY EXAM HIP UNI 2-3 VIEWS: CPT | Mod: 26,LT

## 2024-02-08 PROCEDURE — 85730 THROMBOPLASTIN TIME PARTIAL: CPT

## 2024-02-08 PROCEDURE — 73562 X-RAY EXAM OF KNEE 3: CPT

## 2024-02-08 PROCEDURE — 80074 ACUTE HEPATITIS PANEL: CPT

## 2024-02-08 PROCEDURE — 80053 COMPREHEN METABOLIC PANEL: CPT

## 2024-02-08 PROCEDURE — 85025 COMPLETE CBC W/AUTO DIFF WBC: CPT

## 2024-02-08 PROCEDURE — 36415 COLL VENOUS BLD VENIPUNCTURE: CPT

## 2024-02-08 PROCEDURE — 73502 X-RAY EXAM HIP UNI 2-3 VIEWS: CPT

## 2024-02-08 PROCEDURE — 86663 EPSTEIN-BARR ANTIBODY: CPT

## 2024-02-08 PROCEDURE — 73552 X-RAY EXAM OF FEMUR 2/>: CPT | Mod: 26,LT

## 2024-02-08 PROCEDURE — 86900 BLOOD TYPING SEROLOGIC ABO: CPT

## 2024-02-08 PROCEDURE — 99156 MOD SED OTH PHYS/QHP 5/>YRS: CPT | Mod: XU

## 2024-02-08 PROCEDURE — 86901 BLOOD TYPING SEROLOGIC RH(D): CPT

## 2024-02-08 PROCEDURE — 27265 TREAT HIP DISLOCATION: CPT | Mod: LT

## 2024-02-08 PROCEDURE — 86645 CMV ANTIBODY IGM: CPT

## 2024-02-08 PROCEDURE — 87637 SARSCOV2&INF A&B&RSV AMP PRB: CPT

## 2024-02-08 PROCEDURE — 99285 EMERGENCY DEPT VISIT HI MDM: CPT | Mod: FS,25

## 2024-02-08 PROCEDURE — 99285 EMERGENCY DEPT VISIT HI MDM: CPT | Mod: 25

## 2024-02-08 PROCEDURE — 86644 CMV ANTIBODY: CPT

## 2024-02-08 PROCEDURE — 87521 HEPATITIS C PROBE&RVRS TRNSC: CPT

## 2024-02-08 RX ORDER — KETAMINE HYDROCHLORIDE 100 MG/ML
30 INJECTION INTRAMUSCULAR; INTRAVENOUS ONCE
Refills: 0 | Status: DISCONTINUED | OUTPATIENT
Start: 2024-02-08 | End: 2024-02-08

## 2024-02-08 RX ORDER — PROPOFOL 10 MG/ML
30 INJECTION, EMULSION INTRAVENOUS ONCE
Refills: 0 | Status: COMPLETED | OUTPATIENT
Start: 2024-02-08 | End: 2024-02-08

## 2024-02-08 RX ORDER — ACETAMINOPHEN 500 MG
1000 TABLET ORAL ONCE
Refills: 0 | Status: COMPLETED | OUTPATIENT
Start: 2024-02-08 | End: 2024-02-08

## 2024-02-08 RX ADMIN — PROPOFOL 30 MILLIGRAM(S): 10 INJECTION, EMULSION INTRAVENOUS at 12:29

## 2024-02-08 RX ADMIN — KETAMINE HYDROCHLORIDE 30 MILLIGRAM(S): 100 INJECTION INTRAMUSCULAR; INTRAVENOUS at 12:30

## 2024-02-08 RX ADMIN — Medication 1000 MILLIGRAM(S): at 14:17

## 2024-02-08 RX ADMIN — KETAMINE HYDROCHLORIDE 30 MILLIGRAM(S): 100 INJECTION INTRAMUSCULAR; INTRAVENOUS at 12:29

## 2024-02-08 RX ADMIN — Medication 400 MILLIGRAM(S): at 10:42

## 2024-02-08 NOTE — ED PROVIDER NOTE - SHIFT CHANGE DETAILS
Attending MD Herr: 73F w PMHx of RA, here for hip dislocation (prosthetic), atraumatic, (bent to pick something up), incidental labs yesterday w rheum were normal, here with LFT derangement, pending repeat labs, possible ?lab error, if abnml, outpatient follow up with rheum, can be discharged with ambulation w knee immobilizer per ortho

## 2024-02-08 NOTE — ED PROVIDER NOTE - PHYSICAL EXAMINATION
PE:  Gen: NAD, Pt in position of comfort lying on R side  Head: NCAT  ENT: MMM  Chest: RRR, normal perfusion  Lungs: Symmetrical chest rise, lungs CTAB  Abdomen: soft, NTND  Ext: No gross lower extremity deformities, no bony tenderness, DP pulses equal bilaterally  Neuro: awake and alert   Skin: no rashes

## 2024-02-08 NOTE — ED ADULT NURSE NOTE - OBJECTIVE STATEMENT
73y f pt arrived via ems; emt reports pt bent over to clean floor and felt a pop; now in  pain and cant move; pt had a left hip replacement last august; pt aox3; no resp distress; no sob; no chest pain; pt denies syncope/fall; pt denies head injury; ems placed 20g iv in left lower arm; pt unable to move left leg; pt able to move toes; skin intact; vss; sister in law at beside

## 2024-02-08 NOTE — ED PROVIDER NOTE - ATTENDING APP SHARED VISIT CONTRIBUTION OF CARE
see mdm    edited by Gisella Ureña DO - attending physician.   Please see progress notes for status/labs/consult updates and ED course after initial presentation.

## 2024-02-08 NOTE — ED PROVIDER NOTE - PROGRESS NOTE DETAILS
Gisella Ureña, Attending Physician: I personally reviewed and inerpreted the XR with dislocation, will consult Ortho. Gisella Ureña, Attending Physician: Patient status post procedural sedation is up and awake. Pt's LFTs elevated however after review of LFTs from routine outpatient blood work yesterday there were within normal limits.  now at bedside providing collateral and both adamantly report she did not fall. Will resent CMP as may be in error. Attending MD Herr: Labs reviewed, LFTs persistently elevated, call placed to Dr. Rosario [phone] Attending MD Herr: Second call to Dr. Rosario's service, spoke with Yuko, will await call back. Attending MD Herr: Third call to Dr. Rosario's service, spoke with Liz, will await call back. Attending MD Herr: Fourth call to Dr. Rosario's service, spoke with William, attempted to escalate with supervisor. Attending MD Herr: Fourth call to Dr. Rsoario's service, spoke with William, attempted to escalate with supervisor, Cece.  Explained awaiting call back since 15:40.  Cece reports no way to escalate. After multiple attempts to reach patient's doctor received a call back from Dr. Rosario who saw patient yesterday.  Dr. Rosario was advised of patient's transaminitis with normal lab work from yesterday.  Discussed  if it is possible that patient's transaminitis is related to her Skyrizi as this is a new medication that patient has been on for the last month.  Reports that it is hard to say 100% but will further look into the possibility.  Patient is status post a cholecystectomy and is without abdominal pain, abdominal tenderness nausea or vomiting.  Discussed plan with Dr. Rosario.  Reports that he is not opposed to discharge and requested that patient call him tomorrow to further discuss and to schedule repeat blood work.  Dr. Rosario requested acute hepatitis panel and COVID swab be sent.  I additionally added EBV and CMV.  Plan was discussed with patient and her  at the bedside.  I advised that we will also provide information for rapid follow-up in the case that transaminitis is worsening or persistent.  Spoke with  Ortho Dr. Meade in regards to patient's ability to ambulate who advised that patient is able to weight-bear as tolerated with knee immobilizer in place including upstairs .  Patient to be discharged home with abduction pillow as well which she has used in the past.  Family feels comfortable with taking patient home and following up with Dr. Rosario and orthopedics on 2/13 as advised.  Delaney Wayne PA-C Attending MD Herr: Discussed plan with Dr. Rosario with patient and .  Verbalized understanding.  Will send acute hepatitis panel and patient will call Dr. Rosario's office tomorrow morning.  Stable for discharge. Follow up instructions given, importance of follow up emphasized, return to ED parameters reviewed and patient verbalized understanding.  All questions answered, all concerns addressed.

## 2024-02-08 NOTE — ED PROVIDER NOTE - WR ORDER ID 4
Chief Complaint   Patient presents with   • Hypertension   left low back radiating into the hip    History of Present Illness   Meenakshi Alcala is a 51 y.o. female presents for follow up evaluation. She has hypertension that is very well regulated w/ current medications. She has continued pain in her left low back. Pain radiates into her hip. She has changed from running to cycling and she has even been able to cycle for 4 years. Tried physical therapy and fexeril and continues to have pain. It is fairly constant.       The following portions of the patient's history were reviewed and updated as appropriate: allergies, current medications, past family history, past medical history, past social history, past surgical history and problem list.  Current Outpatient Prescriptions on File Prior to Visit   Medication Sig Dispense Refill   • albuterol (PROVENTIL HFA;VENTOLIN HFA) 108 (90 BASE) MCG/ACT inhaler Inhale 2 puffs Every 6 (Six) Hours. 1 inhaler 6   • amLODIPine (NORVASC) 2.5 MG tablet Take 1 tablet by mouth nightly.     • cloNIDine (CATAPRES) 0.3 MG tablet Take 1 tablet by mouth daily.     • diclofenac (VOLTAREN) 1 % gel gel Apply 4 g topically 4 (Four) Times a Day. 100 g 6   • fluticasone (FLONASE) 50 MCG/ACT nasal spray PLACE TWO SPRAYS IN EACH NOSTRIL ONCE DAILY 16 each 3   • LINZESS 145 MCG capsule TAKE ONE CAPSULE BY MOUTH DAILY 90 capsule 1   • meloxicam (MOBIC) 15 MG tablet Take 1 tablet by mouth Daily. 30 tablet 1   • sertraline (ZOLOFT) 100 MG tablet Take 1 tablet by mouth Daily. 90 tablet 2   • cyclobenzaprine (FLEXERIL) 10 MG tablet Take 1 tablet by mouth 3 (Three) Times a Day As Needed for muscle spasms. 30 tablet 0     No current facility-administered medications on file prior to visit.      Review of Systems   Constitutional: Negative.    HENT: Negative.    Eyes: Negative.    Respiratory: Negative.    Cardiovascular: Negative.    Gastrointestinal: Negative.    Endocrine: Negative.    Genitourinary:  Negative.    Musculoskeletal: Positive for back pain.   Skin: Negative.    Allergic/Immunologic: Negative.    Neurological:        Radicular pain   Hematological: Negative.    Psychiatric/Behavioral: Negative.        Objective   Physical Exam     /74  Pulse 84  Wt 150 lb (68 kg)  SpO2 99%  BMI 24.11 kg/m2    Assessment/Plan   There are no diagnoses linked to this encounter.            [id number]

## 2024-02-08 NOTE — ED PROVIDER NOTE - OBJECTIVE STATEMENT
Gisella Ureña, Attending Physician: 73F with hx of left total hip arthroplasty with history of psoriatic arthritis and prediabetes here for left hip pain.  Patient bent down to help her  and felt a pop in her left hip with sudden pain.  Did not hit her head.  No falls or trauma no numbness or tingling. Pt got 10 mg of Morphine prior to arrival.

## 2024-02-08 NOTE — ED PROVIDER NOTE - NSFOLLOWUPINSTRUCTIONS_ED_ALL_ED_FT
1. You were seen in the emergency department for evaluation after you dislocated your left hip.  You were evaluated by orthopedic service and received procedural sedation with successful reduction of your hip dislocation.    2. While you were in the emergency department we also did blood work which showed new elevation of your liver function test of unclear etiology.  We spoke to your doctor, Dr. Rosario in regards to these abnormalities he will follow-up with you for repeat blood work and further management    3. Recommend follow up with GI after discharge for reevaluation and continued management of your elevated liver function tests. Please see office information below to call and schedule appointment:       [facility]    [address], [address]    [place] [address]    Phone: [phone]    4. As per Orthopedics, please wear blue knee immobilizer at all times.  You are able to walk but must have knee immobilizer on whenever bearing weight.  You are additionally provided with a abduction pillow which should be used while at rest    5. Follow up with Orthopedics on Tuesday 2/13 after discharge for reevaluation and continued management. Please see office information below to call and schedule appointment:       Todd Vargas    Orthopaedic Surgery    [address], [address]    [place] [address]    Phone: [phone]    Fax: [phone]    6. Please rest, stay hydrated continue all home medications as previously prescribed    7. You will receive a call for any outstanding results or may call our ED Administration line at [phone] daily 9am-3pm to obtain results    8. Return to the emergency department for change of symptoms including onset of fevers, abdominal pain, vomiting, weakness, recurrent left hip pain, falls, difficulty walking and any other concerns

## 2024-02-08 NOTE — CONSULT NOTE ADULT - SUBJECTIVE AND OBJECTIVE BOX
73y Female s/p L UCHE 9/28/23 w Dr. Vargas presents c/o L hip dislocation after bending over to pickup pen. Denies HS/LOC. Denies numbness/tingling. Denies fever/chills. Denies pain/injury elsewhere.     Family history of renal failure (Father)    FH: HTN (hypertension) (Father, Mother)    MEWS Score    HTN (hypertension)    HLD (hyperlipidemia)    Psoriatic arthritis    Unilateral primary osteoarthritis, left hip    2019 novel coronavirus disease (COVID-19)    History of cholecystectomy    History of colonoscopy    History of endoscopy    UNILATERAL PRIMARY OSTEOARTHRI    90+    SysAdmin_VisitLink      PAST MEDICAL & SURGICAL HISTORY:  HTN (hypertension)      HLD (hyperlipidemia)      Psoriatic arthritis      Unilateral primary osteoarthritis, left hip      2019 novel coronavirus disease (COVID-19)      History of cholecystectomy      History of colonoscopy      History of endoscopy        MEDICATIONS  (STANDING):    Allergies    methotrexate (Nephrotoxicity; Rash)    Intolerances                            13.0   14.09 )-----------( 202      ( 08 Feb 2024 10:55 )             39.3     08 Feb 2024 10:55    138    |  104    |  25     ----------------------------<  160    4.0     |  19     |  0.75     Ca    9.6        08 Feb 2024 10:55    TPro  7.3    /  Alb  4.1    /  TBili  0.6    /  DBili  x      /  AST  504    /  ALT  242    /  AlkPhos  302    08 Feb 2024 10:55    PT/INR - ( 08 Feb 2024 10:55 )   PT: 10.6 sec;   INR: 1.01 ratio         PTT - ( 08 Feb 2024 10:55 )  PTT:27.9 sec  Vital Signs Last 24 Hrs  T(C): 37.2 (02-08-24 @ 10:30), Max: 37.3 (02-08-24 @ 09:43)  T(F): 99 (02-08-24 @ 10:30), Max: 99.2 (02-08-24 @ 09:43)  HR: 82 (02-08-24 @ 10:30) (82 - 88)  BP: 172/89 (02-08-24 @ 10:30) (172/89 - 179/90)  BP(mean): --  RR: 18 (02-08-24 @ 10:30) (18 - 20)  SpO2: 96% (02-08-24 @ 10:30) (96% - 96%)    Imaging: XR demonstates Left hip prosthesis Posterior dislocation, no obvious fracture.    Physical Exam  General: NAD, Alert, Awake and oriented  Neurologic: No gross deficits, moving all 4s.  Head: NCAT without abrasions, lacerations, or ecchymosis to head, face, or scalp  Eyes: PERRL  Neck/C-Spine: FAROM. No bony TTP.    LEFT LE: Healed Scar over hip. No open skin. Internally Rotated and shortened. No deformities or other signs of trauma at  knee, lower leg, ankle or foot. Full baseline painless ROM at ankle and toes with. SILT toes 1-5. + DP/PT pulses palpable with brisk cap refill distally. Compartments soft and compressible.     RIGHT UE: No open skin. No obvious deformities or  signs of trauma. Full baseline painless ROM at shoulder, elbow, wrist, and .     LEFT UE: No open skin. No obvious deformities or  signs of trauma. Full baseline painless ROM at shoulder, elbow, wrist, and .     A/P: 73y Female with Left hip prosthesis dislocation s/p L UCHE 9/28/23    Procedure: Closed Reduction under conscious sedation in ED    -Post reduction films show hip is located  -Post reduction exam unchanged, +EHL FHL TA GS  -Hip Abduction pillow placed in KI  -WBAT, posterior hip precautions  -Follow up with Dr. Vargas within 1 week

## 2024-02-08 NOTE — ED PROVIDER NOTE - PATIENT PORTAL LINK FT
You can access the FollowMyHealth Patient Portal offered by [facility] by registering at the following website: [email or web address]. By joining [employer]’s FollowMyHealth portal, you will also be able to view your health information using other applications (apps) compatible with our system.

## 2024-02-08 NOTE — ED ADULT NURSE NOTE - NSFALLHARMRISKINTERV_ED_ALL_ED
Assistance OOB with selected safe patient handling equipment if applicable/Assistance with ambulation/Communicate risk of Fall with Harm to all staff, patient, and family/Monitor gait and stability/Provide visual cue: red socks, yellow wristband, yellow gown, etc/Reinforce activity limits and safety measures with patient and family/Bed in lowest position, wheels locked, appropriate side rails in place/Call bell, personal items and telephone in reach/Instruct patient to call for assistance before getting out of bed/chair/stretcher/Non-slip footwear applied when patient is off stretcher/[place] to call system/Physically safe environment - no spills, clutter or unnecessary equipment/Purposeful Proactive Rounding/Room/bathroom lighting operational, light cord in reach

## 2024-02-08 NOTE — ED PROVIDER NOTE - CLINICAL SUMMARY MEDICAL DECISION MAKING FREE TEXT BOX
Gisella Ureña, Attending Physician: 73-year-old female with left hip pain with known left hip arthroplasty.  Differential diagnosis includes not limited to: Musculoskeletal strain, dislocation, fracture, pathologic fracture.  Patient will pain control prior to arrival, will add on Tylenol.  Will get preop labs in the event that this is something that requires operative intervention. XR ordered.

## 2024-02-08 NOTE — ED ADULT TRIAGE NOTE - CHIEF COMPLAINT QUOTE
Left hip pain; unable to bear weight. Felt a "large pop" in left hip while bending over; had hip replacement 08/2023.

## 2024-02-09 LAB
CMV IGG FLD QL: <0.2 U/ML — SIGNIFICANT CHANGE UP
CMV IGG SERPL-IMP: NEGATIVE — SIGNIFICANT CHANGE UP
CMV IGM FLD-ACNC: <8 AU/ML — SIGNIFICANT CHANGE UP
CMV IGM SERPL QL: NEGATIVE — SIGNIFICANT CHANGE UP
EBV EA AB SER IA-ACNC: <5 U/ML — SIGNIFICANT CHANGE UP
EBV EA AB TITR SER IF: POSITIVE
EBV EA IGG SER-ACNC: NEGATIVE — SIGNIFICANT CHANGE UP
EBV NA IGG SER IA-ACNC: 22 U/ML — HIGH
EBV PATRN SPEC IB-IMP: SIGNIFICANT CHANGE UP
EBV VCA IGG AVIDITY SER QL IA: POSITIVE
EBV VCA IGM SER IA-ACNC: 116 U/ML — HIGH
EBV VCA IGM SER IA-ACNC: <10 U/ML — SIGNIFICANT CHANGE UP
EBV VCA IGM TITR FLD: NEGATIVE — SIGNIFICANT CHANGE UP
HAV IGM SER-ACNC: SIGNIFICANT CHANGE UP
HBV CORE IGM SER-ACNC: SIGNIFICANT CHANGE UP
HBV SURFACE AG SER-ACNC: SIGNIFICANT CHANGE UP
HCV AB S/CO SERPL IA: 1.02 S/CO — HIGH (ref 0–0.99)
HCV AB SERPL-IMP: ABNORMAL

## 2024-02-14 ENCOUNTER — APPOINTMENT (OUTPATIENT)
Dept: ORTHOPEDIC SURGERY | Facility: CLINIC | Age: 74
End: 2024-02-14
Payer: MEDICARE

## 2024-02-14 DIAGNOSIS — L40.50 ARTHROPATHIC PSORIASIS, UNSPECIFIED: ICD-10-CM

## 2024-02-14 PROCEDURE — 73502 X-RAY EXAM HIP UNI 2-3 VIEWS: CPT

## 2024-02-14 PROCEDURE — 99213 OFFICE O/P EST LOW 20 MIN: CPT

## 2024-02-21 ENCOUNTER — APPOINTMENT (OUTPATIENT)
Dept: GASTROENTEROLOGY | Facility: CLINIC | Age: 74
End: 2024-02-21

## 2024-04-10 ENCOUNTER — APPOINTMENT (OUTPATIENT)
Dept: ORTHOPEDIC SURGERY | Facility: CLINIC | Age: 74
End: 2024-04-10
Payer: MEDICARE

## 2024-04-10 VITALS — HEIGHT: 61 IN | BODY MASS INDEX: 25.3 KG/M2 | WEIGHT: 134 LBS

## 2024-04-10 PROCEDURE — 99213 OFFICE O/P EST LOW 20 MIN: CPT

## 2024-04-10 PROCEDURE — 73502 X-RAY EXAM HIP UNI 2-3 VIEWS: CPT

## 2024-04-10 RX ORDER — AMOXICILLIN 500 MG/1
500 TABLET, FILM COATED ORAL
Qty: 20 | Refills: 0 | Status: ACTIVE | COMMUNITY
Start: 2024-04-10 | End: 1900-01-01

## 2024-04-19 ENCOUNTER — NON-APPOINTMENT (OUTPATIENT)
Age: 74
End: 2024-04-19

## 2024-06-12 ENCOUNTER — APPOINTMENT (OUTPATIENT)
Dept: ORTHOPEDIC SURGERY | Facility: CLINIC | Age: 74
End: 2024-06-12
Payer: MEDICARE

## 2024-06-12 PROCEDURE — 73502 X-RAY EXAM HIP UNI 2-3 VIEWS: CPT | Mod: PD

## 2024-06-12 PROCEDURE — 99213 OFFICE O/P EST LOW 20 MIN: CPT

## 2024-06-13 ENCOUNTER — INPATIENT (INPATIENT)
Facility: HOSPITAL | Age: 74
LOS: 0 days | Discharge: HOME | DRG: 538 | End: 2024-06-14
Attending: INTERNAL MEDICINE | Admitting: INTERNAL MEDICINE
Payer: MEDICARE

## 2024-06-13 ENCOUNTER — TRANSCRIPTION ENCOUNTER (OUTPATIENT)
Age: 74
End: 2024-06-13

## 2024-06-13 VITALS
SYSTOLIC BLOOD PRESSURE: 144 MMHG | HEART RATE: 86 BPM | WEIGHT: 130.07 LBS | DIASTOLIC BLOOD PRESSURE: 74 MMHG | RESPIRATION RATE: 22 BRPM | OXYGEN SATURATION: 98 % | HEIGHT: 62 IN | TEMPERATURE: 98 F

## 2024-06-13 DIAGNOSIS — Z90.49 ACQUIRED ABSENCE OF OTHER SPECIFIED PARTS OF DIGESTIVE TRACT: Chronic | ICD-10-CM

## 2024-06-13 DIAGNOSIS — Z98.890 OTHER SPECIFIED POSTPROCEDURAL STATES: Chronic | ICD-10-CM

## 2024-06-13 DIAGNOSIS — S73.005A UNSPECIFIED DISLOCATION OF LEFT HIP, INITIAL ENCOUNTER: ICD-10-CM

## 2024-06-13 LAB
ALBUMIN SERPL ELPH-MCNC: 4.1 G/DL — SIGNIFICANT CHANGE UP (ref 3.3–5)
ALBUMIN SERPL ELPH-MCNC: 4.3 G/DL — SIGNIFICANT CHANGE UP (ref 3.3–5)
ALP SERPL-CCNC: 102 U/L — SIGNIFICANT CHANGE UP (ref 40–120)
ALP SERPL-CCNC: 95 U/L — SIGNIFICANT CHANGE UP (ref 40–120)
ALT FLD-CCNC: 13 U/L — SIGNIFICANT CHANGE UP (ref 10–45)
ALT FLD-CCNC: 14 U/L — SIGNIFICANT CHANGE UP (ref 10–45)
ANION GAP SERPL CALC-SCNC: 21 MMOL/L — HIGH (ref 5–17)
ANION GAP SERPL CALC-SCNC: 22 MMOL/L — HIGH (ref 5–17)
AST SERPL-CCNC: 13 U/L — SIGNIFICANT CHANGE UP (ref 10–40)
AST SERPL-CCNC: 15 U/L — SIGNIFICANT CHANGE UP (ref 10–40)
BASE EXCESS BLDV CALC-SCNC: 1.2 MMOL/L — SIGNIFICANT CHANGE UP (ref -2–3)
BASE EXCESS BLDV CALC-SCNC: 1.2 MMOL/L — SIGNIFICANT CHANGE UP (ref -2–3)
BASOPHILS # BLD AUTO: 0.05 K/UL — SIGNIFICANT CHANGE UP (ref 0–0.2)
BASOPHILS NFR BLD AUTO: 0.4 % — SIGNIFICANT CHANGE UP (ref 0–2)
BILIRUB SERPL-MCNC: 0.4 MG/DL — SIGNIFICANT CHANGE UP (ref 0.2–1.2)
BILIRUB SERPL-MCNC: 0.4 MG/DL — SIGNIFICANT CHANGE UP (ref 0.2–1.2)
BUN SERPL-MCNC: 36 MG/DL — HIGH (ref 7–23)
BUN SERPL-MCNC: 40 MG/DL — HIGH (ref 7–23)
CA-I SERPL-SCNC: 1.23 MMOL/L — SIGNIFICANT CHANGE UP (ref 1.15–1.33)
CA-I SERPL-SCNC: 1.24 MMOL/L — SIGNIFICANT CHANGE UP (ref 1.15–1.33)
CALCIUM SERPL-MCNC: 10.3 MG/DL — SIGNIFICANT CHANGE UP (ref 8.4–10.5)
CALCIUM SERPL-MCNC: 11.2 MG/DL — HIGH (ref 8.4–10.5)
CHLORIDE BLDV-SCNC: 100 MMOL/L — SIGNIFICANT CHANGE UP (ref 96–108)
CHLORIDE BLDV-SCNC: 100 MMOL/L — SIGNIFICANT CHANGE UP (ref 96–108)
CHLORIDE SERPL-SCNC: 97 MMOL/L — SIGNIFICANT CHANGE UP (ref 96–108)
CHLORIDE SERPL-SCNC: 99 MMOL/L — SIGNIFICANT CHANGE UP (ref 96–108)
CO2 BLDV-SCNC: 23 MMOL/L — SIGNIFICANT CHANGE UP (ref 22–26)
CO2 BLDV-SCNC: 27 MMOL/L — HIGH (ref 22–26)
CO2 SERPL-SCNC: 19 MMOL/L — LOW (ref 22–31)
CO2 SERPL-SCNC: 19 MMOL/L — LOW (ref 22–31)
CREAT SERPL-MCNC: 0.79 MG/DL — SIGNIFICANT CHANGE UP (ref 0.5–1.3)
CREAT SERPL-MCNC: 0.87 MG/DL — SIGNIFICANT CHANGE UP (ref 0.5–1.3)
EGFR: 69 ML/MIN/1.73M2 — SIGNIFICANT CHANGE UP
EGFR: 78 ML/MIN/1.73M2 — SIGNIFICANT CHANGE UP
EOSINOPHIL # BLD AUTO: 0.02 K/UL — SIGNIFICANT CHANGE UP (ref 0–0.5)
EOSINOPHIL NFR BLD AUTO: 0.2 % — SIGNIFICANT CHANGE UP (ref 0–6)
FLUAV AG NPH QL: SIGNIFICANT CHANGE UP
FLUBV AG NPH QL: SIGNIFICANT CHANGE UP
GAS PNL BLDV: 132 MMOL/L — LOW (ref 136–145)
GAS PNL BLDV: 133 MMOL/L — LOW (ref 136–145)
GAS PNL BLDV: SIGNIFICANT CHANGE UP
GAS PNL BLDV: SIGNIFICANT CHANGE UP
GLUCOSE BLDV-MCNC: 134 MG/DL — HIGH (ref 70–99)
GLUCOSE BLDV-MCNC: 166 MG/DL — HIGH (ref 70–99)
GLUCOSE SERPL-MCNC: 166 MG/DL — HIGH (ref 70–99)
GLUCOSE SERPL-MCNC: 171 MG/DL — HIGH (ref 70–99)
HCO3 BLDV-SCNC: 22 MMOL/L — SIGNIFICANT CHANGE UP (ref 22–29)
HCO3 BLDV-SCNC: 26 MMOL/L — SIGNIFICANT CHANGE UP (ref 22–29)
HCT VFR BLD CALC: 43.9 % — SIGNIFICANT CHANGE UP (ref 34.5–45)
HCT VFR BLDA CALC: 41 % — SIGNIFICANT CHANGE UP (ref 34.5–46.5)
HCT VFR BLDA CALC: 45 % — SIGNIFICANT CHANGE UP (ref 34.5–46.5)
HGB BLD CALC-MCNC: 13.6 G/DL — SIGNIFICANT CHANGE UP (ref 11.7–16.1)
HGB BLD CALC-MCNC: 15 G/DL — SIGNIFICANT CHANGE UP (ref 11.7–16.1)
HGB BLD-MCNC: 14.9 G/DL — SIGNIFICANT CHANGE UP (ref 11.5–15.5)
IMM GRANULOCYTES NFR BLD AUTO: 0.4 % — SIGNIFICANT CHANGE UP (ref 0–0.9)
LACTATE BLDV-MCNC: 1.1 MMOL/L — SIGNIFICANT CHANGE UP (ref 0.5–2)
LACTATE BLDV-MCNC: 3.5 MMOL/L — HIGH (ref 0.5–2)
LYMPHOCYTES # BLD AUTO: 1.07 K/UL — SIGNIFICANT CHANGE UP (ref 1–3.3)
LYMPHOCYTES # BLD AUTO: 9.6 % — LOW (ref 13–44)
MCHC RBC-ENTMCNC: 29.2 PG — SIGNIFICANT CHANGE UP (ref 27–34)
MCHC RBC-ENTMCNC: 33.9 GM/DL — SIGNIFICANT CHANGE UP (ref 32–36)
MCV RBC AUTO: 85.9 FL — SIGNIFICANT CHANGE UP (ref 80–100)
MONOCYTES # BLD AUTO: 0.53 K/UL — SIGNIFICANT CHANGE UP (ref 0–0.9)
MONOCYTES NFR BLD AUTO: 4.8 % — SIGNIFICANT CHANGE UP (ref 2–14)
NEUTROPHILS # BLD AUTO: 9.41 K/UL — HIGH (ref 1.8–7.4)
NEUTROPHILS NFR BLD AUTO: 84.6 % — HIGH (ref 43–77)
NRBC # BLD: 0 /100 WBCS — SIGNIFICANT CHANGE UP (ref 0–0)
PCO2 BLDV: 25 MMHG — LOW (ref 39–42)
PCO2 BLDV: 41 MMHG — SIGNIFICANT CHANGE UP (ref 39–42)
PH BLDV: 7.41 — SIGNIFICANT CHANGE UP (ref 7.32–7.43)
PH BLDV: 7.55 — HIGH (ref 7.32–7.43)
PLATELET # BLD AUTO: 278 K/UL — SIGNIFICANT CHANGE UP (ref 150–400)
PO2 BLDV: 30 MMHG — SIGNIFICANT CHANGE UP (ref 25–45)
PO2 BLDV: 52 MMHG — HIGH (ref 25–45)
POTASSIUM BLDV-SCNC: 3.8 MMOL/L — SIGNIFICANT CHANGE UP (ref 3.5–5.1)
POTASSIUM BLDV-SCNC: 4.6 MMOL/L — SIGNIFICANT CHANGE UP (ref 3.5–5.1)
POTASSIUM SERPL-MCNC: 3.6 MMOL/L — SIGNIFICANT CHANGE UP (ref 3.5–5.3)
POTASSIUM SERPL-MCNC: 4 MMOL/L — SIGNIFICANT CHANGE UP (ref 3.5–5.3)
POTASSIUM SERPL-SCNC: 3.6 MMOL/L — SIGNIFICANT CHANGE UP (ref 3.5–5.3)
POTASSIUM SERPL-SCNC: 4 MMOL/L — SIGNIFICANT CHANGE UP (ref 3.5–5.3)
PROT SERPL-MCNC: 8 G/DL — SIGNIFICANT CHANGE UP (ref 6–8.3)
PROT SERPL-MCNC: 8.3 G/DL — SIGNIFICANT CHANGE UP (ref 6–8.3)
RBC # BLD: 5.11 M/UL — SIGNIFICANT CHANGE UP (ref 3.8–5.2)
RBC # FLD: 11.9 % — SIGNIFICANT CHANGE UP (ref 10.3–14.5)
RSV RNA NPH QL NAA+NON-PROBE: SIGNIFICANT CHANGE UP
SAO2 % BLDV: 51.9 % — LOW (ref 67–88)
SAO2 % BLDV: 80.8 % — SIGNIFICANT CHANGE UP (ref 67–88)
SARS-COV-2 RNA SPEC QL NAA+PROBE: SIGNIFICANT CHANGE UP
SODIUM SERPL-SCNC: 138 MMOL/L — SIGNIFICANT CHANGE UP (ref 135–145)
SODIUM SERPL-SCNC: 139 MMOL/L — SIGNIFICANT CHANGE UP (ref 135–145)
WBC # BLD: 11.13 K/UL — HIGH (ref 3.8–10.5)
WBC # FLD AUTO: 11.13 K/UL — HIGH (ref 3.8–10.5)

## 2024-06-13 PROCEDURE — 99285 EMERGENCY DEPT VISIT HI MDM: CPT

## 2024-06-13 PROCEDURE — 73521 X-RAY EXAM HIPS BI 2 VIEWS: CPT | Mod: 26

## 2024-06-13 PROCEDURE — 73502 X-RAY EXAM HIP UNI 2-3 VIEWS: CPT | Mod: 26,LT

## 2024-06-13 PROCEDURE — 71045 X-RAY EXAM CHEST 1 VIEW: CPT | Mod: 26

## 2024-06-13 PROCEDURE — 73552 X-RAY EXAM OF FEMUR 2/>: CPT | Mod: 26,LT

## 2024-06-13 PROCEDURE — 71275 CT ANGIOGRAPHY CHEST: CPT | Mod: 26,MC

## 2024-06-13 RX ORDER — ACETAMINOPHEN 500 MG
650 TABLET ORAL EVERY 6 HOURS
Refills: 0 | Status: DISCONTINUED | OUTPATIENT
Start: 2024-06-13 | End: 2024-06-14

## 2024-06-13 RX ORDER — TRAMADOL HYDROCHLORIDE 50 MG/1
50 TABLET ORAL EVERY 6 HOURS
Refills: 0 | Status: DISCONTINUED | OUTPATIENT
Start: 2024-06-13 | End: 2024-06-14

## 2024-06-13 RX ORDER — SODIUM CHLORIDE 9 MG/ML
1000 INJECTION INTRAMUSCULAR; INTRAVENOUS; SUBCUTANEOUS ONCE
Refills: 0 | Status: COMPLETED | OUTPATIENT
Start: 2024-06-13 | End: 2024-06-13

## 2024-06-13 RX ORDER — CELECOXIB 200 MG/1
200 CAPSULE ORAL
Refills: 0 | Status: DISCONTINUED | OUTPATIENT
Start: 2024-06-13 | End: 2024-06-14

## 2024-06-13 RX ORDER — PANTOPRAZOLE SODIUM 20 MG/1
40 TABLET, DELAYED RELEASE ORAL
Refills: 0 | Status: DISCONTINUED | OUTPATIENT
Start: 2024-06-13 | End: 2024-06-14

## 2024-06-13 RX ORDER — PROPOFOL 10 MG/ML
60 INJECTION, EMULSION INTRAVENOUS ONCE
Refills: 0 | Status: DISCONTINUED | OUTPATIENT
Start: 2024-06-13 | End: 2024-06-13

## 2024-06-13 RX ORDER — ACETAMINOPHEN 500 MG
1000 TABLET ORAL ONCE
Refills: 0 | Status: COMPLETED | OUTPATIENT
Start: 2024-06-13 | End: 2024-06-13

## 2024-06-13 RX ORDER — ONDANSETRON 8 MG/1
4 TABLET, FILM COATED ORAL ONCE
Refills: 0 | Status: COMPLETED | OUTPATIENT
Start: 2024-06-13 | End: 2024-06-13

## 2024-06-13 RX ORDER — PROPOFOL 10 MG/ML
80 INJECTION, EMULSION INTRAVENOUS ONCE
Refills: 0 | Status: COMPLETED | OUTPATIENT
Start: 2024-06-13 | End: 2024-06-13

## 2024-06-13 RX ORDER — SODIUM CHLORIDE 9 MG/ML
500 INJECTION, SOLUTION INTRAVENOUS ONCE
Refills: 0 | Status: COMPLETED | OUTPATIENT
Start: 2024-06-13 | End: 2024-06-13

## 2024-06-13 RX ORDER — MORPHINE SULFATE 50 MG/1
4 CAPSULE, EXTENDED RELEASE ORAL ONCE
Refills: 0 | Status: DISCONTINUED | OUTPATIENT
Start: 2024-06-13 | End: 2024-06-13

## 2024-06-13 RX ORDER — HEPARIN SODIUM 5000 [USP'U]/ML
5000 INJECTION INTRAVENOUS; SUBCUTANEOUS EVERY 8 HOURS
Refills: 0 | Status: DISCONTINUED | OUTPATIENT
Start: 2024-06-13 | End: 2024-06-14

## 2024-06-13 RX ADMIN — Medication 400 MILLIGRAM(S): at 13:29

## 2024-06-13 RX ADMIN — SODIUM CHLORIDE 1000 MILLILITER(S): 9 INJECTION INTRAMUSCULAR; INTRAVENOUS; SUBCUTANEOUS at 13:30

## 2024-06-13 RX ADMIN — MORPHINE SULFATE 4 MILLIGRAM(S): 50 CAPSULE, EXTENDED RELEASE ORAL at 13:58

## 2024-06-13 RX ADMIN — ONDANSETRON 4 MILLIGRAM(S): 8 TABLET, FILM COATED ORAL at 13:57

## 2024-06-13 RX ADMIN — PROPOFOL 80 MILLIGRAM(S): 10 INJECTION, EMULSION INTRAVENOUS at 14:20

## 2024-06-13 RX ADMIN — SODIUM CHLORIDE 500 MILLILITER(S): 9 INJECTION, SOLUTION INTRAVENOUS at 16:36

## 2024-06-13 NOTE — ED ADULT NURSE NOTE - OBJECTIVE STATEMENT
72 y/o Female presents to ED from home via EMS with c/o hip pain. PMH of hip replacement surgery in september, previous dislocation in february. Pt states she got up today from a sitting position, heard a pop and felt extreme 10/10 pain in left him. Pt was given fentanyl in route. Unable to move left lower extremity due to 10/10 pain. Endorses mild nausea. Denies SOB, CP, HA, fever, V/D. Pt is A&Ox3, speaking full sentences without difficulty. Airway patent with spontaneous breathing, skin is warm and normal color for race. Pt placed on continuous cardiac monitor. IV inserted labs drawn and sent. Safety maintained bed is in the lowest position, locked and call bell in reach.

## 2024-06-13 NOTE — ED PROVIDER NOTE - CLINICAL SUMMARY MEDICAL DECISION MAKING FREE TEXT BOX
Given history and physical exam with previous  hip dislocation and description of symptoms high probability of dislocation again.  Will assess with x-rays blood work pain control orthopedic consultation Given history and physical exam with previous  hip dislocation and description of symptoms high probability of dislocation again.  Will assess with x-rays blood work pain control orthopedic consultation  Attending Ayaka Arias: 73-year-old female presenting with left hip pain.  Patient has a history of prior hip surgery done by Dr. Vargas in August 2023.  Had a prior dislocation back in February.  Patient presents today when she was bending down and she noticed she started having pain in the left side of her hip denies any further trauma.  No numbness or tingling.  Patient complaining of pain to the left side of her leg.  Patient last ate or drank at 630 this morning.  No headache or blurry vision.  Patient given fentanyl prior to arrival by EMS.  Upon arrival patient uncomfortable with left-sided hip pain.  Patient is awake and alert following commands.  Abdomen is soft nontender.  Patient with left hip does internally rotated and shortened with tenderness to palpation.  Distal pulses are intact.  No open wounds.  Concern based on history for dislocation.  Will obtain basic labs, x-rays, n.p.o., discussed with Ortho and likely sedate for reduction.

## 2024-06-13 NOTE — ED PROVIDER NOTE - WR ORDER NAME 3
[Labia Majora] : normal [Labia Minora] : normal [Normal] : normal [Absent] : absent [Uterine Adnexae] : normal Xray Hip 2-3 Views, Left

## 2024-06-13 NOTE — ED PROVIDER NOTE - PHYSICAL EXAMINATION
GENERAL: Awake, alert, NAD  LUNGS: non labored breathing   ABDOMEN: Soft, ,  non distended, no guarding  EXT: No edema, no calf tenderness, 2+ DP pulses bilaterally,   NEURO: A&Ox3. Moving all extremities.  SKIN: Warm and dry. No rash.  PSYCH: Normal affect. GENERAL: Awake, alert, NAD  LUNGS: non labored breathing   ABDOMEN: Soft, ,  non distended, no guarding  EXT: No edema, no calf tenderness, 2+ DP pulses bilaterally,   NEURO: A&Ox3. Moving all extremities.  SKIN: Warm and dry. No rash.  PSYCH: Normal affect.  Attending Ayaka Arias: Gen: uncofmrtable , heent: atrauamtic, eomi, perrla, mmm, op pink, uvula midline, neck; nttp, no nuchal rigidity, chest: nttp, no crepitus, cv: rrr, no murmurs, lungs: ctab, abd: soft, nontender, nondistended, no peritoneal signs,  no guarding, ext:left ext shortened and rotated, distal pulses intact skin: no rash, neuro: awake and alert, following commands, speech clear, sensation and strength intact, no focal deficits

## 2024-06-13 NOTE — ED ADULT NURSE REASSESSMENT NOTE - NS ED NURSE REASSESS COMMENT FT1
Pt sating 86-87% on room air/ fluctuating to 96%. Pt is aysmptomatic, denies complaints. MD Hogan aware

## 2024-06-13 NOTE — CONSULT NOTE ADULT - SUBJECTIVE AND OBJECTIVE BOX
Orthopedic Surgery    73 year old female with history of HTN and psoriatic arthritis who previously underwent L UCHE with Dr. Vargas on 8/31/23. Patient had a previous left hip dislocation in February of this year that was closed reduced in ED. This morning patient reports she was standing up from a chair and felt a pop in her hip and was unable to ambulate. Denies any numbness or tingling in her legs. Denies head strike or loss of consciousness.     ICU Vital Signs Last 24 Hrs  T(C): 36.7 (13 Jun 2024 12:33), Max: 36.7 (13 Jun 2024 12:33)  T(F): 98 (13 Jun 2024 12:33), Max: 98 (13 Jun 2024 12:33)  HR: 81 (13 Jun 2024 14:45) (77 - 92)  BP: 131/57 (13 Jun 2024 14:45) (131/57 - 174/72)  BP(mean): 79 (13 Jun 2024 14:45) (79 - 103)  ABP: --  ABP(mean): --  RR: 17 (13 Jun 2024 14:45) (14 - 25)  SpO2: 90% (13 Jun 2024 14:45) (90% - 100%)          Physical exam:   Gen: NAD, alert and oriented  Resp: Unlabored breathing  LLE:   Leg internally rotated and shortened.  Skin intact, no ecchymosis,        SILT DP/SP/ Lam/Saph/tib       +EHL/FHL/TA/Gastroc,        DP+,        soft compartments, no calf ttp     LABS:                        14.9   11.13 )-----------( 278      ( 13 Jun 2024 13:13 )             43.9     06-13    139  |  99  |  36<H>  ----------------------------<  166<H>  3.6   |  19<L>  |  0.79    Ca    10.3      13 Jun 2024 14:49    TPro  8.0  /  Alb  4.1  /  TBili  0.4  /  DBili  x   /  AST  13  /  ALT  13  /  AlkPhos  95  06-13      Urinalysis Basic - ( 13 Jun 2024 14:49 )    Color: x / Appearance: x / SG: x / pH: x  Gluc: 166 mg/dL / Ketone: x  / Bili: x / Urobili: x   Blood: x / Protein: x / Nitrite: x   Leuk Esterase: x / RBC: x / WBC x   Sq Epi: x / Non Sq Epi: x / Bacteria: x    Procedure:  Patient was successfully conciously sedated by the Emergency department. While sedated a closed reduction of the left hip was successfully performed and xray findings confirmed the hip was relocated. The patient tolerated the procedure well and was neurovascularly intact following closed reduction.         Assessment/Plan:   73 year old female with left prosthetic hip dislocation status post successful closed reduction.    Stable for discharge home  WBAT in LLE  Left knee in knee immobilizer  Abduction pillow while in bed  Follow up with Dr. Vargas early next week    Joe Caldwell PGY-1    For any questions please contact the on call orthopedic surgery team, please do not reach out via teams.  [facility] [place]: [phone]  [facility] pager: [phone]  [facility] pager: [phone]

## 2024-06-13 NOTE — ED PROVIDER NOTE - NSFOLLOWUPINSTRUCTIONS_ED_ALL_ED_FT
Hip dislocation happens when the "ball" at the top of the thigh bone (femur) moves out of its normal place.    Hip dislocation may involve a broken (fractured) bone. It can also involve damage to:  Nerves.  Tissues that connect bones (ligaments).  Tissues that connect muscles to bones (tendons).  Hip dislocation is an emergency. Get treatment right away.    What are the causes?  This type of injury may be caused by:  A hard, direct hit to a bent knee. This can force the femur back against the hip joint.  Motor vehicle accidents.  Falls.  Contact sports.  Workplace injuries.  What increases the risk?  You are more likely to dislocate your hip if:  You have an artificial (prosthetic) hip.  You are overweight or obese.  You lack hip strength and flexibility.  You have a problem with your hip joint that was present at birth (congenital).  You have a condition that puts you more at risk for falling.  You take part in contact sports.  What are the signs or symptoms?  Symptoms of this condition may include:  Severe pain.  Not being able to move your hip.  The leg on the injured side appearing shorter than the other leg.  Inward or outward turning of the foot on the injured side.  Loss of feeling or numbness in the lower leg, foot, or ankle.  How is this diagnosed?  This condition may be diagnosed based on:  Your medical history.  A physical exam.  Imaging tests. X-rays may be done to check for bone fractures.  How is this treated?  This condition is treated by moving the ball of your femur back into your hip socket (reduction). This may be done by:  Closed reduction. This is when the reduction is done by hand by a health care provider.  Surgical reduction. This is when you have surgery to move the top of your femur back into place.  You may need surgery if:  You have a fracture.  You have pieces of bone in your joint.  You have damaged blood vessels or nerves.  Closed reduction did not work.  After your reduction, treatment may include:  Crutches.  A brace.  Physical therapy.  Follow these instructions at home:  Medicines    Take over-the-counter and prescription medicines only as told by your health care provider.  Ask your health care provider if the medicine prescribed to you:  Requires you to avoid driving or using machinery.  Can cause constipation. You may need to take these actions to prevent or treat constipation:  Drink enough fluid to keep your urine pale yellow.  Take over-the-counter or prescription medicines.  Eat foods that are high in fiber, such as beans, whole grains, and fresh fruits and vegetables.  Limit foods that are high in fat and processed sugars, such as fried or sweet foods.  If you have a removable brace:    Wear the brace as told by your health care provider. Remove it only as told by your health care provider.  Check the skin around the brace every day. Tell your health care provider about any concerns.  Loosen the brace if your toes tingle, become numb, or turn cold and blue.  Keep the brace clean and dry.  Bathing    Do not take baths, swim, or use a hot tub until your health care provider approves. Ask your health care provider if you may take showers. You may only be allowed to take sponge baths.  If your brace is not waterproof:  Do not let it get wet.  Cover it with a watertight covering when you take a bath or shower.  Managing pain, stiffness, and swelling    Bag of ice on a towel on the skin.  If directed, put ice on the affected area. To do this:  If you have a removable brace, remove it as told by your health care provider.  Put ice in a plastic bag.  Place a towel between your skin and the bag.  Leave the ice on for 20 minutes, 2–3 times a day.  If your skin turns bright red, remove the ice right away to prevent skin damage. The risk of skin damage is higher if you cannot feel pain, heat, or cold.  Move your toes often to reduce stiffness and swelling.  Raise (elevate) the injured area above the level of your heart while you are sitting or lying down.  Wear compression stockings or wraps as told by your health care provider.  Activity    Do not use your hip to support your body weight until your health care provider says that you can. Use crutches or a walker as told by your health care provider.  You may have to avoid lifting. Ask your health care provider how much you can safely lift.  Do exercises as told by your health care provider.  Return to your normal activities as told by your health care provider. Ask your health care provider what activities are safe for you.  Driving    If you were given a sedative during the procedure, it can affect you for several hours. Do not drive or operate machinery until your health care provider says that it is safe.  Ask your health care provider when it is safe to drive if you have a brace on your hip.  General instructions    Do not use any products that contain nicotine or tobacco. These products include cigarettes, chewing tobacco, and vaping devices, such as e-cigarettes. If you need help quitting, ask your health care provider.  Keep all follow-up visits. Your health care provider will want to see how you are progressing.  Contact a health care provider if:  Your pain gets worse or does not get better with medicine.  You have swelling or red skin on your hip area or leg.  You feel tingling in your hip, leg, or foot.  Get help right away if:  You feel like your hip has dislocated again.  You have severe pain in your hip or groin.  You have numbness or weakness in your leg.  You cannot move any part of your hip or leg.  These symptoms may be an emergency. Get help right away. Call 911.  Do not wait to see if the symptoms will go away.  Do not drive yourself to the hospital.  This information is not intended to replace advice given to you by your health care provider. Make sure you discuss any questions you have with your health care provider.

## 2024-06-13 NOTE — ED ADULT NURSE NOTE - NSFALLRISKINTERV_ED_ALL_ED
Assistance OOB with selected safe patient handling equipment if applicable/Assistance with ambulation/Communicate fall risk and risk factors to all staff, patient, and family/Monitor gait and stability/Provide visual cue: yellow wristband, yellow gown, etc/Reinforce activity limits and safety measures with patient and family/Call bell, personal items and telephone in reach/Instruct patient to call for assistance before getting out of bed/chair/stretcher/Non-slip footwear applied when patient is off stretcher/[place] to call system/Physically safe environment - no spills, clutter or unnecessary equipment/Purposeful Proactive Rounding/Room/bathroom lighting operational, light cord in reach

## 2024-06-13 NOTE — H&P ADULT - HISTORY OF PRESENT ILLNESS
73-year-old female history of total left hip replacement chief complaint of left hip pain.    ·	Patient had a total hip replacement September of last year and got dislocated 02/24   Patient was sitting down went to go stand up and felt a pop/severe pain.  Patient was coming in from private residence.

## 2024-06-13 NOTE — ED PROVIDER NOTE - OBJECTIVE STATEMENT
73-year-old female history of total left hip replacement chief complaint of left hip pain.  Had a total hip replacement September of last year with Dr. Vargas and previously got dislocated in February of this year.  Patient was sitting down went to go stand up and felt a pop/severe pain.  Patient was coming in from private residence.

## 2024-06-13 NOTE — ED PROVIDER NOTE - CARE PLAN
Principal Discharge DX:	Hip dislocation, left   1 Principal Discharge DX:	Hip dislocation, left  Secondary Diagnosis:	Hypoxia

## 2024-06-13 NOTE — H&P ADULT - ASSESSMENT
73-year-old female history of total left hip replacement chief complaint of left hip pain.     Ortho consulted   s/p Closed reduction   PT   Pain meds     D/C planning      73-year-old female history of total left hip replacement chief complaint of left hip pain.     Ortho consulted   s/p Closed reduction   PT   Pain meds       Acute Hypoxic respiratory Failure     CTA neg for PE   Likely from Pain     Wean off Oxygen as tolerated       D/C planning

## 2024-06-13 NOTE — ED ADULT NURSE REASSESSMENT NOTE - NS ED NURSE REASSESS COMMENT FT1
On re-assessment pt currently appears comfortable resting in stretcher in room with appropriate side rails up for safety. pt is awake and alert, vital signs stable, breathing even and unlabored on 2L nasal cannula, NAD noted at this time. Plan of care discussed with pt. Pt pending CTr. Report receievd from DEN Neri. On re-assessment pt currently appears comfortable resting in stretcher in room with appropriate side rails up for safety. pt is awake and alert, vital signs stable, breathing even and unlabored on 2L nasal cannula, NAD noted at this time. Plan of care discussed with pt. Pt pending CTr.

## 2024-06-13 NOTE — ED ADULT NURSE REASSESSMENT NOTE - NS ED NURSE REASSESS COMMENT FT1
Pt to remain on  per MD order. [facility] room made aware of  needs and parameters to alert team when pulse ox less than 93%. Spoke to tele tech.

## 2024-06-14 ENCOUNTER — TRANSCRIPTION ENCOUNTER (OUTPATIENT)
Age: 74
End: 2024-06-14

## 2024-06-14 VITALS
SYSTOLIC BLOOD PRESSURE: 144 MMHG | DIASTOLIC BLOOD PRESSURE: 77 MMHG | HEART RATE: 77 BPM | OXYGEN SATURATION: 93 % | RESPIRATION RATE: 18 BRPM | TEMPERATURE: 99 F

## 2024-06-14 LAB
APPEARANCE UR: CLEAR — SIGNIFICANT CHANGE UP
BACTERIA # UR AUTO: NEGATIVE /HPF — SIGNIFICANT CHANGE UP
BILIRUB UR-MCNC: NEGATIVE — SIGNIFICANT CHANGE UP
CAST: 0 /LPF — SIGNIFICANT CHANGE UP (ref 0–4)
COLOR SPEC: YELLOW — SIGNIFICANT CHANGE UP
DIFF PNL FLD: NEGATIVE — SIGNIFICANT CHANGE UP
GLUCOSE UR QL: NEGATIVE MG/DL — SIGNIFICANT CHANGE UP
KETONES UR-MCNC: ABNORMAL MG/DL
LEUKOCYTE ESTERASE UR-ACNC: NEGATIVE — SIGNIFICANT CHANGE UP
NITRITE UR-MCNC: NEGATIVE — SIGNIFICANT CHANGE UP
PH UR: 6.5 — SIGNIFICANT CHANGE UP (ref 5–8)
PROT UR-MCNC: NEGATIVE MG/DL — SIGNIFICANT CHANGE UP
RBC CASTS # UR COMP ASSIST: 1 /HPF — SIGNIFICANT CHANGE UP (ref 0–4)
SP GR SPEC: 1.02 — SIGNIFICANT CHANGE UP (ref 1–1.03)
SQUAMOUS # UR AUTO: 0 /HPF — SIGNIFICANT CHANGE UP (ref 0–5)
UROBILINOGEN FLD QL: 0.2 MG/DL — SIGNIFICANT CHANGE UP (ref 0.2–1)
WBC UR QL: 0 /HPF — SIGNIFICANT CHANGE UP (ref 0–5)

## 2024-06-14 PROCEDURE — 80053 COMPREHEN METABOLIC PANEL: CPT

## 2024-06-14 PROCEDURE — 97161 PT EVAL LOW COMPLEX 20 MIN: CPT

## 2024-06-14 PROCEDURE — 71045 X-RAY EXAM CHEST 1 VIEW: CPT

## 2024-06-14 PROCEDURE — 82803 BLOOD GASES ANY COMBINATION: CPT

## 2024-06-14 PROCEDURE — 82435 ASSAY OF BLOOD CHLORIDE: CPT

## 2024-06-14 PROCEDURE — 99223 1ST HOSP IP/OBS HIGH 75: CPT

## 2024-06-14 PROCEDURE — 84295 ASSAY OF SERUM SODIUM: CPT

## 2024-06-14 PROCEDURE — 83605 ASSAY OF LACTIC ACID: CPT

## 2024-06-14 PROCEDURE — 72170 X-RAY EXAM OF PELVIS: CPT | Mod: XU

## 2024-06-14 PROCEDURE — 27266 TREAT HIP DISLOCATION: CPT | Mod: LT

## 2024-06-14 PROCEDURE — 96375 TX/PRO/DX INJ NEW DRUG ADDON: CPT | Mod: XU

## 2024-06-14 PROCEDURE — 87637 SARSCOV2&INF A&B&RSV AMP PRB: CPT

## 2024-06-14 PROCEDURE — 96374 THER/PROPH/DIAG INJ IV PUSH: CPT | Mod: XU

## 2024-06-14 PROCEDURE — 0241U: CPT

## 2024-06-14 PROCEDURE — 29505 APPLICATION LONG LEG SPLINT: CPT | Mod: LT

## 2024-06-14 PROCEDURE — 82330 ASSAY OF CALCIUM: CPT

## 2024-06-14 PROCEDURE — 73502 X-RAY EXAM HIP UNI 2-3 VIEWS: CPT

## 2024-06-14 PROCEDURE — 85014 HEMATOCRIT: CPT

## 2024-06-14 PROCEDURE — 84132 ASSAY OF SERUM POTASSIUM: CPT

## 2024-06-14 PROCEDURE — 73552 X-RAY EXAM OF FEMUR 2/>: CPT

## 2024-06-14 PROCEDURE — 99285 EMERGENCY DEPT VISIT HI MDM: CPT | Mod: 25

## 2024-06-14 PROCEDURE — 81001 URINALYSIS AUTO W/SCOPE: CPT

## 2024-06-14 PROCEDURE — 73521 X-RAY EXAM HIPS BI 2 VIEWS: CPT

## 2024-06-14 PROCEDURE — 71275 CT ANGIOGRAPHY CHEST: CPT | Mod: MC

## 2024-06-14 PROCEDURE — 85018 HEMOGLOBIN: CPT

## 2024-06-14 PROCEDURE — 82947 ASSAY GLUCOSE BLOOD QUANT: CPT

## 2024-06-14 PROCEDURE — 85025 COMPLETE CBC W/AUTO DIFF WBC: CPT

## 2024-06-14 RX ORDER — DILTIAZEM HCL 120 MG
2 CAPSULE, EXT RELEASE 24 HR ORAL
Refills: 0 | DISCHARGE

## 2024-06-14 RX ORDER — IRBESARTAN 75 MG/1
1 TABLET ORAL
Refills: 0 | DISCHARGE

## 2024-06-14 RX ORDER — POTASSIUM CHLORIDE 20 MEQ
20 PACKET (EA) ORAL ONCE
Refills: 0 | Status: COMPLETED | OUTPATIENT
Start: 2024-06-14 | End: 2024-06-14

## 2024-06-14 RX ORDER — DENOSUMAB 60 MG/ML
60 INJECTION SUBCUTANEOUS
Qty: 0 | Refills: 0 | DISCHARGE

## 2024-06-14 RX ORDER — CELECOXIB 200 MG/1
1 CAPSULE ORAL
Qty: 0 | Refills: 0 | DISCHARGE
Start: 2024-06-14

## 2024-06-14 RX ORDER — ADALIMUMAB 40MG/0.8ML
40 KIT SUBCUTANEOUS
Qty: 0 | Refills: 0 | DISCHARGE

## 2024-06-14 RX ORDER — CELECOXIB 200 MG/1
1 CAPSULE ORAL
Refills: 0 | DISCHARGE

## 2024-06-14 RX ORDER — ACETAMINOPHEN 500 MG
2 TABLET ORAL
Qty: 0 | Refills: 0 | DISCHARGE
Start: 2024-06-14

## 2024-06-14 RX ORDER — PANTOPRAZOLE SODIUM 20 MG/1
1 TABLET, DELAYED RELEASE ORAL
Qty: 0 | Refills: 0 | DISCHARGE
Start: 2024-06-14

## 2024-06-14 RX ADMIN — CELECOXIB 200 MILLIGRAM(S): 200 CAPSULE ORAL at 05:11

## 2024-06-14 RX ADMIN — Medication 20 MILLIEQUIVALENT(S): at 05:09

## 2024-06-14 RX ADMIN — PANTOPRAZOLE SODIUM 40 MILLIGRAM(S): 20 TABLET, DELAYED RELEASE ORAL at 05:11

## 2024-06-14 NOTE — PHYSICAL THERAPY INITIAL EVALUATION ADULT - NSPTDISCHREC_GEN_A_CORE
Home w/ outpatient PT/Outpatient PT Home w/ outpatient PT when cleared by MD/Outpatient PT Will f/u w/ orthopedics next week/Outpatient PT

## 2024-06-14 NOTE — PHYSICAL THERAPY INITIAL EVALUATION ADULT - IMPAIRED TRANSFERS: SIT/STAND, REHAB EVAL
ROM of LLE limited 2/2 knee immobilizer and posterior precautions/impaired motor control/impaired postural control/decreased ROM

## 2024-06-14 NOTE — DISCHARGE NOTE PROVIDER - NSDCFUSCHEDAPPT_GEN_ALL_CORE_FT
[facility]  ORTHOSURG [address] [facility]  Scheduled Appointment: 06/19/2024

## 2024-06-14 NOTE — DISCHARGE NOTE NURSING/CASE MANAGEMENT/SOCIAL WORK - PATIENT PORTAL LINK FT
You can access the FollowMyHealth Patient Portal offered by [facility] by registering at the following website: [email or web address]. By joining [employer]’s FollowMyHealth portal, you will also be able to view your health information using other applications (apps) compatible with our system.

## 2024-06-14 NOTE — PHYSICAL THERAPY INITIAL EVALUATION ADULT - IMPAIRMENTS CONTRIBUTING TO GAIT DEVIATIONS, PT EVAL
ROM of LLE limited 2/2 knee immobilizer/impaired motor control/impaired postural control/decreased ROM

## 2024-06-14 NOTE — DISCHARGE NOTE PROVIDER - HOSPITAL COURSE
RSV Bronchiolitis
  73 year old female with history of HTN and psoriatic arthritis who previously underwent L UCHE with Dr. Vargas on 8/31/23. Patient had a previous left hip dislocation in February of this year that was closed reduced in ED. Patient reports she was standing up from a chair and felt a pop in her hip and was unable to ambulate. Denies any numbness or tingling in her legs. Denies head strike or loss of consciousness.     Procedure:  Patient was successfully consciously sedated by the Emergency department. While sedated a closed reduction of the left hip was successfully performed and xray findings confirmed the hip was relocated. The patient tolerated the procedure well and was neurovascularly intact following closed reduction.     Shortly after procedure, patient became hypoxic with requirements of O2.     #Left Hip reduction   -Left knee in knee immobilizer  -Abduction pillow while in bed  -Follow up with Dr. Vargas early next week    #hypoxia  -NC 3 l/min  -continuos SPO2 monitoring

## 2024-06-14 NOTE — PHYSICAL THERAPY INITIAL EVALUATION ADULT - IMPAIRMENTS CONTRIBUTING IMPAIRED BED MOBILITY, REHAB EVAL
L knee immobilizer/impaired motor control/impaired postural control/decreased ROM/decreased strength

## 2024-06-14 NOTE — PHYSICAL THERAPY INITIAL EVALUATION ADULT - CRITERIA FOR SKILLED THERAPEUTIC INTERVENTIONS
Home w/ outpatient PT when cleared/anticipated discharge recommendation will f/u w/ her orthopedic MD next wed/anticipated discharge recommendation

## 2024-06-14 NOTE — PATIENT PROFILE ADULT - FALL HARM RISK - HARM RISK INTERVENTIONS

## 2024-06-14 NOTE — DISCHARGE NOTE PROVIDER - CARE PROVIDER_API CALL
Todd Vargas  Orthopaedic Surgery  [address], [address]  [place] [address]  Phone: [phone]  Fax: [phone]  Follow Up Time:    Todd Vargas  Orthopaedic Surgery  [address], [address]  [place] [address]  Phone: [phone]  Fax: [phone]  Follow Up Time:     Braulio Rosario  Rheumatology  [address], [address]  [place] [address]  Phone: [phone]  Fax: [phone]  Follow Up Time:

## 2024-06-14 NOTE — DISCHARGE NOTE PROVIDER - NSDCHC_MEDRECSTATUS_GEN_ALL_CORE
[place] Titus is a 50year old female who presents for Patient presents with:  Rheumatoid Arthritis  Medication Follow-Up  Leg Pain: Right  Hip Pain: Right  . HPI:     I had the pleasure of seeing [place] on 3/27/2017 for evaluation. before. She has passed out before - was taken to hospital about 2 years ago - high liver enzymes. She had labs in [facility] [place]. She had a 2decho at [place] - that was good. She didn't have the PFT done recently.      She gets joint The rayanud's is a little bit there in her feet. She's doing ok. The tips of her fingers are getting more sore. She has small calcifications at tips of finger. s     4/30/2018  She was getting colds and flu in Winter. She had flare up of mouth ulcers.  She feel swollen . She has more raynad's in her feet than before. Everything hurt.s   The gabapentin helped in the beginning . But it stopped working. No side effects. Not drowsy. The ibuprofen helps more.      3/4/2019  She was coming into work - she fell Admission Reconciliation is Not Complete  Discharge Reconciliation is Not Complete sunday has dry mouth - she's had this for years. She gets sore starting on her tongue. She has had SSA since 2014 and 2018 - repeatedly - c/w overlap of sjogrens' as well. She has some weight loss as well in the past - she has been down to 124lbs.  Not Nortriptyline HCl 10 MG Oral Cap Day 1-3: take 1 cap nightly, then increase to 2 caps nightly 60 capsule 1   • methotrexate 2.5 MG Oral Tab Take 8 tablets (20 mg total) by mouth every 7 days.  40 tablet 2   • Potassium Chloride ER 10 MEQ Oral Tab CR Take 2 change in weight or appetitie - stable right now,   Derm: No rashes, intermittent oral ulcers, no alopecia, no photosensitivity, no psoriasis  HEENT:  dry eyes,  Tip of tongue has had dry mouth, gets Raynaud's - gets sores on her fingers - , no nasal ulcer Right hip groing tender   SLR negative -           Component      Latest Ref Rng & Units 5/21/2014   SSA AUTOAB      0 - 99 AU/mL 454 (H)   SSB AUTOAB      0 - 99 AU/mL <100   Sm AUTOAB (Stein)      0 - 99 AU/mL <100   RNP AUTOAB      0 - 99 AU/mL <100 comparison. PA pressure 28mmHg    5/14/2018 - PFTs complete  Spirometry, flow volume loop, lung volumes are all within normal   limits.    No evidence of restriction or obstruction.  \  There is mild increased RV / TLC ratio with normal TLC ,   suggestive issues with balance per pt - even after right ankle boot removed from her sprain   She has seen neurology in 12/2018 - she had declined emg b/c of pain associated with this   - encouraged to f/u with neurology regarding equilibrium issues and decreased sen swab for viral causes for ulcers- like CMV or fungal - in the past  lidocatin gel didn 'thelp   - may be infalmmatory /autoimmune sice repsonsive to prednisone - she's on low dose pred 5m ga dy a- would now increase b/c nto a good idea in scleroderma pts - Admission Reconciliation is Not Complete  Discharge Reconciliation is Completed

## 2024-06-14 NOTE — PHYSICAL THERAPY INITIAL EVALUATION ADULT - ADDITIONAL COMMENTS
Pt lives in private residence Pt lives in private residence w/ her . There are 7 steps to enter the home w/ a R handrail. The house is 3 stories with 6 steps to reach the second floor and 7 steps to reach the 3rd floor w/ a left handrail where the bedroom is. Pt has a walk in shower w/ no grab bars or shower chair. Pt wears glasses for driving, is R handed, has no hearing aids. Pt used a RW briefly after her initial L UCHE in August 2023 but has not used a device for ambulation lately PTA. Pt's  is able to assist her w/ ADLs and ambulation if needed.

## 2024-06-14 NOTE — PHYSICAL THERAPY INITIAL EVALUATION ADULT - PERTINENT HX OF CURRENT PROBLEM, REHAB EVAL
73 year old female with history of HTN and psoriatic arthritis who previously underwent L UCHE with Dr. Vargas on 8/31/23. Patient had a previous left hip dislocation in February of this year that was closed reduced in ED. Patient reports she was standing up from a chair and felt a pop in her hip and was unable to ambulate. Denies any numbness or tingling in her legs. Denies head strike or loss of consciousness. Pt s/p successful closed reduction in the ED 6/13 and neurovascularly intact. Shortly after procedure, patient became hypoxic with requirements of O2.    XR Pelvis, hip, and femur 6/13 .  Superolateral dislocation of the left hip arthroplasty femoral head., XR pelvis and hip 6/13: Reduced left hip arthroplasty dislocation. CXR 6/13: clear lungs, (-)pleural effusion. CT angio 6/13: No pulmonary embolism or other acute intrathoracic pathology. Exophytic left thyroid nodule measuring at least 2 cm 73 year old female with history of HTN and psoriatic arthritis who previously underwent L UCHE with Dr. Vargas on 8/31/23. Patient had a previous left hip dislocation in February of this year that was closed reduced in ED. During the morning of 6/13, patient reports she was standing up from a chair and felt a pop in her hip and was unable to ambulate. Denies any numbness or tingling in her legs. Denies head strike or loss of consciousness. Pt s/p successful closed reduction in the ED 6/13 and neurovascularly intact. Shortly after procedure, patient became hypoxic with requirements of O2.    XR Pelvis, hip, and femur 6/13 .  Superolateral dislocation of the left hip arthroplasty femoral head., XR pelvis and hip 6/13: Reduced left hip arthroplasty dislocation. CXR 6/13: clear lungs, (-)pleural effusion. CT angio 6/13: No pulmonary embolism or other acute intrathoracic pathology. Exophytic left thyroid nodule measuring at least 2 cm

## 2024-06-14 NOTE — CHART NOTE - NSCHARTNOTEFT_GEN_A_CORE
this patient has a history as given. I saw her in the office this week and was doing well. She twisted her leg while getting up and probably internally rotated with adductio yesterday Dislocated her THR.  She is a small woman and had Rheumatoid /Psoriasis type arthritis with multiple joint affected.    Hematology/Lymphatics:	    Endocrine:	    Allergic/Immunologic:	    Vital Signs Last 24 Hrs  T(C): 37 (2024 13:08), Max: 37 (2024 13:08)  T(F): 98.6 (2024 13:08), Max: 98.6 (2024 13:08)  HR: 77 (2024 13:08) (74 - 85)  BP: 144/77 (2024 13:08) (117/71 - 153/73)  BP(mean): --  RR: 18 (2024 13:08) (16 - 18)  SpO2: 93% (2024 13:08) (93% - 98%)        LABS:                        14.9   11.13 )-----------( 278      ( 2024 13:13 )             43.9     06-13    139  |  99  |  36<H>  ----------------------------<  166<H>  3.6   |  19<L>  |  0.79    Ca    10.3      2024 14:49    TPro  8.0  /  Alb  4.1  /  TBili  0.4  /  DBili  x   /  AST  13  /  ALT  13  /  AlkPhos  95  06-13      Urinalysis Basic - ( 2024 11:04 )    Color: Yellow / Appearance: Clear / S.020 / pH: x  Gluc: x / Ketone: Trace mg/dL  / Bili: Negative / Urobili: 0.2 mg/dL   Blood: x / Protein: Negative mg/dL / Nitrite: Negative   Leuk Esterase: Negative / RBC: 1 /HPF / WBC 0 /HPF   Sq Epi: x / Non Sq Epi: 0 /HPF / Bacteria: Negative /HPF        RADIOLOGY & ADDITIONAL TESTS:    History:      Orthopaedic Physical Exam:    Constitutional - the patient is of normal build and nutrition; and oriented to person, time, and place. Mood is normal. There are no obvious major deformities. Vital signs as recorded are within normal limits. The patients gait is normal.  The patient has normal balance. The patient can easily walk on toes and heels.    The patient has no difficulty with respiration. The rate is normal. The patient is not short of breath and has not become short of breath with short ambulation. There is no audible wheezing. No coughing.  she did have postural hypotension which has resolved.      Neurologically intact.     UPPER EXTREMITIES   multiple hand deformities       Circulation appears satisfactory in the upper and lower extremities. Pedal pulses present. There is no major edema in the lower legs. No skin tenderness or increased temperature.        Her hip was re-located by our residents last evening.  I spoke with them then. She is now comfortable and will start ambulation this afternoon. I saw her about 11:00 AN today. If she does well she can be discharged. I had a long discussion with the patient and her  about risks and position to avoid and the possibility of future surgery. She will be followed as an out patient.             Todd Vargas MD  [facility]  [phone]                    Discussion/Summary

## 2024-06-14 NOTE — DISCHARGE NOTE PROVIDER - CARE PROVIDERS DIRECT ADDRESSES
[email or web address]

## 2024-06-14 NOTE — DISCHARGE NOTE PROVIDER - NSDCMRMEDTOKEN_GEN_ALL_CORE_FT
acetaminophen 325 mg oral tablet: 3 tab(s) orally every 8 hours X 5 days, then as needed for mild pain  aspirin 325 mg oral delayed release tablet: 1 tab(s) orally 2 times a day X 6 Weeks to prevent blood clots MDD: 2  Avapro 150 mg oral tablet: 1 orally 2 times a day  Calcium 600+D 600 mg-5 mcg (200 intl units) oral tablet: 1 orally once a day  CeleBREX 200 mg oral capsule: 1 orally 2 times a day  DilTIAZem (Eqv-Cardizem CD) 120 mg/24 hours oral capsule, extended release: 2 orally once a day 2tabs in AM and 1tab in PM  Humira 40 mg/0.8 mL subcutaneous kit: 40 kit subcutaneously every other week On Tuesday. Post surgery Restart when instructed by your rheumatologist.  Lipitor 10 mg oral tablet: 1 orally once a day (at bedtime)  Narcan 4 mg/0.1 mL nasal spray: 4 milligram(s) intranasally every 2 to 3 minutes alternating between nostrils in event of narcotic overdose  oxyCODONE 5 mg oral tablet: 1 tab(s) orally every 4 hours as needed for Moderate Pain (4 - 6) ; 2 Tabs PO Q4H PRN Severe Pain MDD: 6  pantoprazole 40 mg oral delayed release tablet: 1 tab(s) orally once a day before breakfast for gastrointestinal protection MDD: 1  PreserVision AREDS 2 oral capsule: orally 2 times a day  Prolia 60 mg/mL subcutaneous solution: 60 solution subcutaneously every 6 months last dose in May 2023. Post surgery resume this medication as instructed by your rheumatologist  senna leaf extract oral tablet: 2 tab(s) orally once a day (at bedtime) to prevent constipation while taking narcotics for pain control (Stop if having diarrhea) MDD: 1  traMADol 50 mg oral tablet: 1 tab(s) orally every 6 hours as needed for Mild Pain (1 - 3) MDD: 4   acetaminophen 325 mg oral tablet: 2 tab(s) orally every 6 hours As needed Mild Pain (1 - 3)  Calcium 600+D 600 mg-5 mcg (200 intl units) oral tablet: 1 orally once a day  celecoxib 200 mg oral capsule: 1 cap(s) orally 2 times a day  Lipitor 10 mg oral tablet: 1 orally once a day (at bedtime)  pantoprazole 40 mg oral delayed release tablet: 1 tab(s) orally once a day (before a meal)  PreserVision AREDS 2 oral capsule: orally 2 times a day

## 2024-06-14 NOTE — DISCHARGE NOTE NURSING/CASE MANAGEMENT/SOCIAL WORK - NSDCPEFALRISK_GEN_ALL_CORE
For information on Fall & Injury Prevention, visit: [email or web address] OR  [email or web address] OR  https://www.cdc.gov/steadi/patient.html

## 2024-06-14 NOTE — DISCHARGE NOTE PROVIDER - NSDCCPCAREPLAN_GEN_ALL_CORE_FT
PRINCIPAL DISCHARGE DIAGNOSIS  Diagnosis: Hip dislocation, left  Assessment and Plan of Treatment: s/p closed reduction, cleared by Ortho, f/up as op      SECONDARY DISCHARGE DIAGNOSES  Diagnosis: Hypoxia  Assessment and Plan of Treatment: resolved

## 2024-06-14 NOTE — PHYSICAL THERAPY INITIAL EVALUATION ADULT - BALANCE DISTURBANCE, IDENTIFIED IMPAIRMENT CONTRIBUTE, REHAB EVAL
ROM of LLE limited 2/2 knee immobilizer/impaired motor control/impaired postural control/decreased ROM/decreased strength

## 2024-06-14 NOTE — PHYSICAL THERAPY INITIAL EVALUATION ADULT - ACTIVE RANGE OF MOTION EXAMINATION, REHAB EVAL
LLE NT 2/2 knee immobilizer and closed reduction 6/13/bilateral upper extremity Active ROM was WFL (within functional limits)/Right LE Active ROM was WFL (within functional limits)

## 2024-06-19 ENCOUNTER — APPOINTMENT (OUTPATIENT)
Dept: ORTHOPEDIC SURGERY | Facility: CLINIC | Age: 74
End: 2024-06-19
Payer: MEDICARE

## 2024-06-19 DIAGNOSIS — Z87.828 PERSONAL HISTORY OF OTHER (HEALED) PHYSICAL INJURY AND TRAUMA: ICD-10-CM

## 2024-06-19 DIAGNOSIS — Z96.642 PRESENCE OF LEFT ARTIFICIAL HIP JOINT: ICD-10-CM

## 2024-06-19 PROCEDURE — 72170 X-RAY EXAM OF PELVIS: CPT

## 2024-06-19 PROCEDURE — 99213 OFFICE O/P EST LOW 20 MIN: CPT

## 2024-07-24 ENCOUNTER — APPOINTMENT (OUTPATIENT)
Dept: ORTHOPEDIC SURGERY | Facility: CLINIC | Age: 74
End: 2024-07-24
Payer: MEDICARE

## 2024-07-24 DIAGNOSIS — Z96.642 PRESENCE OF LEFT ARTIFICIAL HIP JOINT: ICD-10-CM

## 2024-07-24 PROCEDURE — 99213 OFFICE O/P EST LOW 20 MIN: CPT

## 2024-09-25 ENCOUNTER — APPOINTMENT (OUTPATIENT)
Dept: ORTHOPEDIC SURGERY | Facility: CLINIC | Age: 74
End: 2024-09-25
Payer: MEDICARE

## 2024-09-25 DIAGNOSIS — Z87.828 PERSONAL HISTORY OF OTHER (HEALED) PHYSICAL INJURY AND TRAUMA: ICD-10-CM

## 2024-09-25 PROCEDURE — 73502 X-RAY EXAM HIP UNI 2-3 VIEWS: CPT

## 2024-09-25 PROCEDURE — 99213 OFFICE O/P EST LOW 20 MIN: CPT

## 2024-09-25 NOTE — DISCUSSION/SUMMARY
[de-identified] : Long discussion was had with both the patient and her  again again about her risk of dislocation as long as the patient is very cautious about position she is functioning well.  The problem is that the constrained prosthesis is for this design have had their own failures.  I feel at this time if she is managing and avoiding the adduction and internal rotating of her leg and her function is good then she can stay on conservative measures.  If she does have another repeat dislocation then her options again will be reviewed with her.    .  At present the patient has a 50 mm shell and she is a small individual it is in excellent position she has a 10 degree high wall liner present and a 32 head.

## 2024-09-25 NOTE — DISCUSSION/SUMMARY
[de-identified] : Long discussion was had with both the patient and her  again again about her risk of dislocation as long as the patient is very cautious about position she is functioning well.  The problem is that the constrained prosthesis is for this design have had their own failures.  I feel at this time if she is managing and avoiding the adduction and internal rotating of her leg and her function is good then she can stay on conservative measures.  If she does have another repeat dislocation then her options again will be reviewed with her.    .  At present the patient has a 50 mm shell and she is a small individual it is in excellent position she has a 10 degree high wall liner present and a 32 head.

## 2024-09-25 NOTE — PHYSICAL EXAM
[de-identified] : This patient has severe psoriatic arthritis.  She has psoriatic arthritis and is constantly dropping things because of the deformity in both of her hands and in her case the psoriatic arthritis has been since very severe of multiple joints in her body.   She has dislocated her total hip twice although the position for the hip appears to be excellent.  She is very conscious now of not ED ducting and internally rotating her leg and has had no problems since the last dislocation.  She is walking well.  She does not have pain.   At this time she easily flexes her left hip 220 degrees she abducts it 65 degrees she externally rotates 50 degrees AB but she is not AD ducting or internally rotating.  She is walking well.     [de-identified] : An AP of the pelvis and a lateral of the left hip were done the AP of the pelvis shows the right femoral head round joint space generally maintained some increased subchondral sclerosis present.  Her left hip shows the DJO all porous total hip replacement.  Is in excellent position it is well-fixed.

## 2024-09-25 NOTE — PHYSICAL EXAM
[de-identified] : This patient has severe psoriatic arthritis.  She has psoriatic arthritis and is constantly dropping things because of the deformity in both of her hands and in her case the psoriatic arthritis has been since very severe of multiple joints in her body.   She has dislocated her total hip twice although the position for the hip appears to be excellent.  She is very conscious now of not ED ducting and internally rotating her leg and has had no problems since the last dislocation.  She is walking well.  She does not have pain.   At this time she easily flexes her left hip 220 degrees she abducts it 65 degrees she externally rotates 50 degrees AB but she is not AD ducting or internally rotating.  She is walking well.     [de-identified] : An AP of the pelvis and a lateral of the left hip were done the AP of the pelvis shows the right femoral head round joint space generally maintained some increased subchondral sclerosis present.  Her left hip shows the DJO all porous total hip replacement.  Is in excellent position it is well-fixed.

## 2024-09-25 NOTE — REASON FOR VISIT
[Follow-Up Visit] : a follow-up visit for [Spouse] : spouse [FreeTextEntry2] : left THR recurrent dislocation

## 2024-09-25 NOTE — HISTORY OF PRESENT ILLNESS
[de-identified] : Patient is status post left THR 8/31/23. She sustained another left hip dislocation on 6/13/24, preceded by another left hip dislocation in February. Patient was sitting down in a chair at home and went to go stand up and felt a pop/severe pain in her left hip. She was taken to [facility] ED where her left hip was reduced. She comes into office today comfortable ambulating without noted issues or hip instability.

## 2024-09-25 NOTE — HISTORY OF PRESENT ILLNESS
[de-identified] : Patient is status post left THR 8/31/23. She sustained another left hip dislocation on 6/13/24, preceded by another left hip dislocation in February. Patient was sitting down in a chair at home and went to go stand up and felt a pop/severe pain in her left hip. She was taken to [facility] ED where her left hip was reduced. She comes into office today comfortable ambulating without noted issues or hip instability.

## 2024-11-02 ENCOUNTER — NON-APPOINTMENT (OUTPATIENT)
Age: 74
End: 2024-11-02

## 2024-12-30 NOTE — PHYSICAL THERAPY INITIAL EVALUATION ADULT - BALANCE DISTURBANCE, SYSTEM IMPAIRMENT CONTRIBUTE, REHAB EVAL
Medication: Voltaren passed protocol.   Last office visit date: 12/16/24  Next appointment scheduled?: No; no due   Number of refills given: 1   musculoskeletal

## 2025-01-04 NOTE — ED ADULT NURSE NOTE - NSHOSCREENINGQ1_ED_ALL_ED
Vancomycin Dosing by Pharmacy- INITIAL    Jacqueline Sandoval is a 87 y.o. year old female who Pharmacy has been consulted for vancomycin dosing for other UTI . Based on the patient's indication and renal status this patient will be dosed based on a goal AUC of 400-600.     Renal function is currently declining.    Visit Vitals  /68   Pulse (!) 37   Temp 36.3 °C (97.3 °F)   Resp 18        Lab Results   Component Value Date    CREATININE 2.12 (H) 2025    CREATININE 2.05 (H) 2024    CREATININE 1.57 (H) 2023    CREATININE 1.76 (H) 2023        Patient weight is as follows:   Vitals:    25 1042   Weight: 62.9 kg (138 lb 10.7 oz)       Cultures:  No results found for the encounter in last 14 days.        I/O last 3 completed shifts:  In: 50 (0.8 mL/kg) [IV Piggyback:50]  Out: - (0 mL/kg)   Weight: 62.9 kg   I/O during current shift:  No intake/output data recorded.    Temp (24hrs), Av.1 °C (97 °F), Min:35.9 °C (96.6 °F), Max:36.3 °C (97.3 °F)         Assessment/Plan     Patient has already been given a loading dose of 1250 mg.  Will initiate vancomycin maintenance,  500 mg every 24 hours.    This dosing regimen is predicted by [employer] to result in the following pharmacokinetic parameters:  Regimen: 500 mg IV every 24 hours.  Start time: 12:28 on 2025  Exposure target: AUC24 (range)400-600 mg/L.hr   IWH75-52: 401 mg/L.hr  AUC24,ss: 495 mg/L.hr  Probability of AUC24 > 400: 74 %  Ctrough,ss: 17.7 mg/L  Probability of Ctrough,ss > 20: 37 %    Follow-up level will be ordered on 1/3 at 2100, and  at AM labs, unless clinically indicated sooner.  Will continue to monitor renal function daily while on vancomycin and order serum creatinine at least every 48 hours if not already ordered.  Follow for continued vancomycin needs, clinical response, and signs/symptoms of toxicity.       Shamika Mehta, PharmD        No

## 2025-01-04 NOTE — PRE-ANESTHESIA EVALUATION ADULT - NSANTHDISPORD_GEN_ALL_CORE
PACU 84/M with CAD, Atrial fibrillation on Pradaxa, CVA (?), DM type 2, HTN and osteoarthritis who presented with chronic left hip pain x 6 months that failed conservative management and admitted for an elective YO by Dr. Echeverria.    #Left hip osteoarthritis   #Post op state  - pain controlled on Tylenol 1g q8h and PRN Tramadol  - Provide adequate analgesia, Incentive spirometry, mobilize with fall precautions, bowel regimen, DVT prophylaxis  - PT/OT    #low flow-low gradient AS  #prior HFmrEF  Satting well on RA, HDS, no wheezing today, bilateral pitting to 1+ LE edema  CXR 12/29: Pulmonary vascular congestion. No pneumothorax. Moderate bilateral pleural effusions. The cardiomediastinal silhouette is suboptimally evaluated. No acute osseous abnormality  ProBNP: 3554  Echo 08/24: Left ventricular systolic function is grossly mildly decreased with an ejection fraction visually estimated at 45 to 50 %. Regional wall motion abnormalities present. Left atrium is moderately dilated. Mild to moderate aortic regurgitation. Moderate aortic stenosis  Stress test 10/24: Normal myocardial perfusion scan, with no evidence of infarction or inducible ischemia  - sp 20mg IV lasix 12/29, 1/1, PO lasix 40 12/30, PO lasix 20 1/3, volume exam improved today, deferring diuresis.  Given ptn low-flow low gradient aortic stenosis, will only consider diuresis very cautiously and evaluate volume status and need day by day.  Discussed w structural cardiology team.  - discussed w ptn and friend bedside need for daily weights and monitoring of leg edema to assess volume status, will potentially need low dose PRN PO lasix on discharge for when ptn becomes more hypervolemic, to be used w caution and w discussion w outpatient cardiologist  - repeat echo 12/29: EF 60-65%, low flow low gradient aortic stenosis, valve area 0.52  - Continue metoprolol 25, lisinopril 20  - Follows with cardiologist Dr AVELINO GALICIA, please ensure outpatient follow-up closely on discharge  - will need follow up w structural cardiology on discharge, please give patient information at discharge to the outpatient office,     #Leucocytosis, today in 10s, improving  - likely iso UTI given dysuria and UA w WBCs, pending ucx  - on ctx plan for 3d course  - monitor temp and WBC trend    #persistent dry cough, improving  - negative RVP 12/31, trial tessalon perles for symptoms  - sp SLP eval, sp FEES, ok to continue diet, speech to discuss w ENT re anatomy    #Constipation  - abdomen Xray no obstruction nor free air  - BM reported by the patient   - mobilize  - bowel regimen with Miralax BID and Senna once at night    #Acute blood loss anemia  - Monitor Hgb; no reported bleeding symptoms   - Transfuse for Hgb < 7  - Ensure Type and Screen available    #Urinary retention  - Lee catheter dced, now on condom cath, on flomax per urology  - ensure adequate bowel regimen in place    #CAD  #Atrial fibrillation  - rate controlled on Metoprolol, Amiodarone 100  - on dabigatran 150 q12    #HTN  - on Metoprolol, Amlodipine and Lisinopril  - ensure BP parameters in place    #DM type 2  - A1C 7, controlled  - on correctional insulin; goal glucose 100-180 mg/dL  - can resume Metformin on discharge  - DM diet    DVT ppx: Dabigatran
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
## 2025-01-23 ENCOUNTER — APPOINTMENT (OUTPATIENT)
Dept: ORTHOPEDIC SURGERY | Facility: CLINIC | Age: 75
End: 2025-01-23
Payer: MEDICARE

## 2025-01-23 ENCOUNTER — NON-APPOINTMENT (OUTPATIENT)
Age: 75
End: 2025-01-23

## 2025-01-23 VITALS — WEIGHT: 134 LBS | BODY MASS INDEX: 25.3 KG/M2 | HEIGHT: 61 IN

## 2025-01-23 DIAGNOSIS — M51.26 OTHER INTERVERTEBRAL DISC DISPLACEMENT, LUMBAR REGION: ICD-10-CM

## 2025-01-23 DIAGNOSIS — Z96.642 PRESENCE OF LEFT ARTIFICIAL HIP JOINT: ICD-10-CM

## 2025-01-23 DIAGNOSIS — M41.26 OTHER IDIOPATHIC SCOLIOSIS, LUMBAR REGION: ICD-10-CM

## 2025-01-23 DIAGNOSIS — R26.89 OTHER ABNORMALITIES OF GAIT AND MOBILITY: ICD-10-CM

## 2025-01-23 PROCEDURE — 73502 X-RAY EXAM HIP UNI 2-3 VIEWS: CPT

## 2025-01-23 PROCEDURE — 72100 X-RAY EXAM L-S SPINE 2/3 VWS: CPT

## 2025-01-23 PROCEDURE — 99213 OFFICE O/P EST LOW 20 MIN: CPT

## 2025-03-11 ENCOUNTER — RX ONLY (RX ONLY)
Age: 75
End: 2025-03-11

## 2025-03-11 ENCOUNTER — OFFICE (OUTPATIENT)
Dept: URBAN - METROPOLITAN AREA CLINIC 12 | Facility: CLINIC | Age: 75
Setting detail: OPHTHALMOLOGY
End: 2025-03-11
Payer: MEDICARE

## 2025-03-11 DIAGNOSIS — H35.3132: ICD-10-CM

## 2025-03-11 DIAGNOSIS — H16.223: ICD-10-CM

## 2025-03-11 DIAGNOSIS — E11.9: ICD-10-CM

## 2025-03-11 DIAGNOSIS — H25.13: ICD-10-CM

## 2025-03-11 DIAGNOSIS — H43.393: ICD-10-CM

## 2025-03-11 PROCEDURE — 99204 OFFICE O/P NEW MOD 45 MIN: CPT | Performed by: OPHTHALMOLOGY

## 2025-03-11 PROCEDURE — 92134 CPTRZ OPH DX IMG PST SGM RTA: CPT | Performed by: OPHTHALMOLOGY

## 2025-03-11 ASSESSMENT — REFRACTION_CURRENTRX
OD_SPHERE: +0.50
OS_CYLINDER: -1.25
OD_AXIS: 097
OS_SPHERE: -0.75
OD_VPRISM_DIRECTION: SV
OS_AXIS: 038
OD_CYLINDER: -1.50
OS_OVR_VA: 20/
OS_VPRISM_DIRECTION: SV
OD_OVR_VA: 20/

## 2025-03-11 ASSESSMENT — CONFRONTATIONAL VISUAL FIELD TEST (CVF)
OD_FINDINGS: FULL
OS_FINDINGS: FULL

## 2025-03-11 ASSESSMENT — SUPERFICIAL PUNCTATE KERATITIS (SPK)
OS_SPK: 1+
OD_SPK: 1+

## 2025-03-11 ASSESSMENT — KERATOMETRY
OD_AXISANGLE_DEGREES: 176
OS_K2POWER_DIOPTERS: 47.75
OS_AXISANGLE_DEGREES: 166
OD_K1POWER_DIOPTERS: 45.50
OS_K1POWER_DIOPTERS: 47.25
OD_K2POWER_DIOPTERS: 47.75

## 2025-03-11 ASSESSMENT — REFRACTION_AUTOREFRACTION
OD_AXIS: 102
OS_CYLINDER: -1.75
OD_SPHERE: +1.00
OS_SPHERE: PLANO
OS_AXIS: 067
OD_CYLINDER: -2.25

## 2025-03-11 ASSESSMENT — VISUAL ACUITY
OS_BCVA: 20/40+
OD_BCVA: 20/40+1

## 2025-03-11 ASSESSMENT — TONOMETRY
OS_IOP_MMHG: 16
OD_IOP_MMHG: 15

## 2025-04-22 ENCOUNTER — OFFICE (OUTPATIENT)
Dept: URBAN - METROPOLITAN AREA CLINIC 12 | Facility: CLINIC | Age: 75
Setting detail: OPHTHALMOLOGY
End: 2025-04-22
Payer: MEDICARE

## 2025-04-22 DIAGNOSIS — H25.13: ICD-10-CM

## 2025-04-22 DIAGNOSIS — H25.11: ICD-10-CM

## 2025-04-22 PROBLEM — H02.403 PTOSIS OF EYELID, UNSPECIFIED; BOTH EYES: Status: ACTIVE | Noted: 2025-04-22

## 2025-04-22 PROCEDURE — 92136 OPHTHALMIC BIOMETRY: CPT | Mod: 26,RT | Performed by: OPHTHALMOLOGY

## 2025-04-22 PROCEDURE — 92136 OPHTHALMIC BIOMETRY: CPT | Mod: TC | Performed by: OPHTHALMOLOGY

## 2025-04-22 PROCEDURE — 99213 OFFICE O/P EST LOW 20 MIN: CPT | Performed by: OPHTHALMOLOGY

## 2025-04-22 ASSESSMENT — VISUAL ACUITY
OS_BCVA: 20/100
OD_BCVA: 20/60

## 2025-04-22 ASSESSMENT — SUPERFICIAL PUNCTATE KERATITIS (SPK)
OD_SPK: 1+
OS_SPK: 1+

## 2025-04-22 ASSESSMENT — REFRACTION_CURRENTRX
OS_VPRISM_DIRECTION: SV
OS_SPHERE: -0.75
OS_CYLINDER: -1.25
OD_CYLINDER: -1.50
OS_OVR_VA: 20/
OD_VPRISM_DIRECTION: SV
OD_OVR_VA: 20/
OD_AXIS: 097
OS_AXIS: 038
OD_SPHERE: +0.50

## 2025-04-22 ASSESSMENT — CONFRONTATIONAL VISUAL FIELD TEST (CVF)
OS_FINDINGS: FULL
OD_FINDINGS: FULL

## 2025-04-22 ASSESSMENT — REFRACTION_AUTOREFRACTION
OD_CYLINDER: -2.50
OD_AXIS: 108
OS_AXIS: 074
OS_SPHERE: +0.50
OS_CYLINDER: -2.75
OD_SPHERE: +1.25

## 2025-04-22 ASSESSMENT — KERATOMETRY
OD_K2POWER_DIOPTERS: 48.00
OS_K1POWER_DIOPTERS: 46.25
OD_K1POWER_DIOPTERS: 45.25
OS_K2POWER_DIOPTERS: 47.75
OS_AXISANGLE_DEGREES: 164
OD_AXISANGLE_DEGREES: 177

## 2025-04-22 ASSESSMENT — TONOMETRY
OS_IOP_MMHG: 21
OD_IOP_MMHG: 21

## 2025-04-22 ASSESSMENT — LID POSITION - PTOSIS
OD_PTOSIS: RUL 2+
OS_PTOSIS: LUL 2+

## 2025-04-24 ENCOUNTER — APPOINTMENT (OUTPATIENT)
Dept: CARDIOLOGY | Facility: CLINIC | Age: 75
End: 2025-04-24
Payer: MEDICARE

## 2025-04-24 ENCOUNTER — NON-APPOINTMENT (OUTPATIENT)
Age: 75
End: 2025-04-24

## 2025-04-24 VITALS
BODY MASS INDEX: 25.11 KG/M2 | WEIGHT: 133 LBS | HEIGHT: 61 IN | SYSTOLIC BLOOD PRESSURE: 147 MMHG | OXYGEN SATURATION: 98 % | DIASTOLIC BLOOD PRESSURE: 72 MMHG | HEART RATE: 63 BPM

## 2025-04-24 VITALS — DIASTOLIC BLOOD PRESSURE: 79 MMHG | SYSTOLIC BLOOD PRESSURE: 127 MMHG

## 2025-04-24 DIAGNOSIS — Z01.810 ENCOUNTER FOR PREPROCEDURAL CARDIOVASCULAR EXAMINATION: ICD-10-CM

## 2025-04-24 DIAGNOSIS — E78.5 HYPERLIPIDEMIA, UNSPECIFIED: ICD-10-CM

## 2025-04-24 DIAGNOSIS — I10 ESSENTIAL (PRIMARY) HYPERTENSION: ICD-10-CM

## 2025-04-24 PROCEDURE — 99214 OFFICE O/P EST MOD 30 MIN: CPT | Mod: 25

## 2025-04-24 PROCEDURE — 93000 ELECTROCARDIOGRAM COMPLETE: CPT | Mod: NC

## 2025-04-24 RX ORDER — UPADACITINIB 45 MG/1
TABLET, EXTENDED RELEASE ORAL
Refills: 0 | Status: ACTIVE | COMMUNITY

## 2025-04-28 ENCOUNTER — NON-APPOINTMENT (OUTPATIENT)
Age: 75
End: 2025-04-28

## 2025-05-05 ENCOUNTER — ASC (OUTPATIENT)
Dept: URBAN - METROPOLITAN AREA SURGERY 8 | Facility: SURGERY | Age: 75
Setting detail: OPHTHALMOLOGY
End: 2025-05-05
Payer: MEDICARE

## 2025-05-05 DIAGNOSIS — H25.11: ICD-10-CM

## 2025-05-05 DIAGNOSIS — H52.221: ICD-10-CM

## 2025-05-05 PROCEDURE — FEMTO PRECISION LASER CATARACT SURGERY: Mod: GY | Performed by: OPHTHALMOLOGY

## 2025-05-05 PROCEDURE — 66984 XCAPSL CTRC RMVL W/O ECP: CPT | Mod: RT | Performed by: OPHTHALMOLOGY

## 2025-05-05 PROCEDURE — V2787 ASTIGMATISM-CORRECT FUNCTION: HCPCS | Performed by: OPHTHALMOLOGY

## 2025-05-05 PROCEDURE — S9986 NOT MEDICALLY NECESSARY SVC: HCPCS | Mod: GX,GY | Performed by: OPHTHALMOLOGY

## 2025-05-06 ENCOUNTER — OFFICE (OUTPATIENT)
Dept: URBAN - METROPOLITAN AREA CLINIC 12 | Facility: CLINIC | Age: 75
Setting detail: OPHTHALMOLOGY
End: 2025-05-06
Payer: MEDICARE

## 2025-05-06 ENCOUNTER — RX ONLY (RX ONLY)
Age: 75
End: 2025-05-06

## 2025-05-06 DIAGNOSIS — Z96.1: ICD-10-CM

## 2025-05-06 PROCEDURE — 99024 POSTOP FOLLOW-UP VISIT: CPT | Performed by: OPTOMETRIST

## 2025-05-06 ASSESSMENT — LID POSITION - PTOSIS
OD_PTOSIS: RUL 2+
OS_PTOSIS: LUL 2+

## 2025-05-06 ASSESSMENT — KERATOMETRY
OS_K2POWER_DIOPTERS: 48.00
OS_AXISANGLE_DEGREES: 174
OD_K1POWER_DIOPTERS: 43.75
OD_K2POWER_DIOPTERS: 47.00
OS_K1POWER_DIOPTERS: 46.25
OD_AXISANGLE_DEGREES: 180

## 2025-05-06 ASSESSMENT — REFRACTION_CURRENTRX
OD_VPRISM_DIRECTION: SV
OD_OVR_VA: 20/
OD_SPHERE: +0.50
OS_AXIS: 038
OS_CYLINDER: -1.25
OS_OVR_VA: 20/
OS_SPHERE: -0.75
OS_VPRISM_DIRECTION: SV
OD_CYLINDER: -1.50
OD_AXIS: 097

## 2025-05-06 ASSESSMENT — REFRACTION_AUTOREFRACTION
OD_AXIS: 051
OD_CYLINDER: -0.50
OS_SPHERE: +1.50
OS_AXIS: 071
OD_SPHERE: +1.00
OS_CYLINDER: -2.50

## 2025-05-06 ASSESSMENT — VISUAL ACUITY
OD_BCVA: 20/50-1
OS_BCVA: 20/30-2

## 2025-05-06 ASSESSMENT — CONFRONTATIONAL VISUAL FIELD TEST (CVF)
OD_FINDINGS: FULL
OS_FINDINGS: FULL

## 2025-05-06 ASSESSMENT — SUPERFICIAL PUNCTATE KERATITIS (SPK)
OS_SPK: 1+
OD_SPK: 1+

## 2025-05-06 ASSESSMENT — TONOMETRY
OS_IOP_MMHG: 16
OD_IOP_MMHG: 14

## 2025-05-13 ENCOUNTER — OFFICE (OUTPATIENT)
Dept: URBAN - METROPOLITAN AREA CLINIC 12 | Facility: CLINIC | Age: 75
Setting detail: OPHTHALMOLOGY
End: 2025-05-13
Payer: MEDICARE

## 2025-05-13 DIAGNOSIS — H25.12: ICD-10-CM

## 2025-05-13 PROCEDURE — 99024 POSTOP FOLLOW-UP VISIT: CPT | Mod: 26,LT | Performed by: OPHTHALMOLOGY

## 2025-05-13 ASSESSMENT — REFRACTION_CURRENTRX
OS_OVR_VA: 20/
OS_VPRISM_DIRECTION: SV
OS_AXIS: 038
OD_AXIS: 097
OS_CYLINDER: -1.25
OS_SPHERE: -0.75
OD_OVR_VA: 20/
OD_VPRISM_DIRECTION: SV
OD_CYLINDER: -1.50
OD_SPHERE: +0.50

## 2025-05-13 ASSESSMENT — TONOMETRY
OS_IOP_MMHG: 20
OD_IOP_MMHG: 20

## 2025-05-13 ASSESSMENT — KERATOMETRY
OD_K2POWER_DIOPTERS: 47.25
METHOD_AUTO_MANUAL: AUTO
OD_K1POWER_DIOPTERS: 44.25
OS_K2POWER_DIOPTERS: 48.00
OS_K1POWER_DIOPTERS: 46.75
OD_AXISANGLE_DEGREES: 005
OS_AXISANGLE_DEGREES: 157

## 2025-05-13 ASSESSMENT — REFRACTION_AUTOREFRACTION
OS_AXIS: 067
OS_SPHERE: +0.50
OD_CYLINDER: -0.50
OD_AXIS: 109
OS_CYLINDER: -2.25
OD_SPHERE: +1.25

## 2025-05-13 ASSESSMENT — LID POSITION - PTOSIS
OD_PTOSIS: RUL 2+
OS_PTOSIS: LUL 2+

## 2025-05-13 ASSESSMENT — VISUAL ACUITY
OS_BCVA: 20/20-1
OD_BCVA: 20/50

## 2025-05-13 ASSESSMENT — CONFRONTATIONAL VISUAL FIELD TEST (CVF)
OS_FINDINGS: FULL
OD_FINDINGS: FULL

## 2025-05-13 ASSESSMENT — SUPERFICIAL PUNCTATE KERATITIS (SPK)
OS_SPK: 1+
OD_SPK: 1+

## 2025-05-19 ENCOUNTER — ASC (OUTPATIENT)
Dept: URBAN - METROPOLITAN AREA SURGERY 8 | Facility: SURGERY | Age: 75
Setting detail: OPHTHALMOLOGY
End: 2025-05-19
Payer: MEDICARE

## 2025-05-19 DIAGNOSIS — H52.222: ICD-10-CM

## 2025-05-19 DIAGNOSIS — H25.12: ICD-10-CM

## 2025-05-19 PROCEDURE — 66984 XCAPSL CTRC RMVL W/O ECP: CPT | Mod: 79,LT | Performed by: OPHTHALMOLOGY

## 2025-05-19 PROCEDURE — S9986 NOT MEDICALLY NECESSARY SVC: HCPCS | Mod: GX,GY | Performed by: OPHTHALMOLOGY

## 2025-05-19 PROCEDURE — FEMTO PRECISION LASER CATARACT SURGERY: Mod: GY | Performed by: OPHTHALMOLOGY

## 2025-05-19 PROCEDURE — V2787 ASTIGMATISM-CORRECT FUNCTION: HCPCS | Performed by: OPHTHALMOLOGY

## 2025-05-20 ENCOUNTER — OFFICE (OUTPATIENT)
Dept: URBAN - METROPOLITAN AREA CLINIC 12 | Facility: CLINIC | Age: 75
Setting detail: OPHTHALMOLOGY
End: 2025-05-20
Payer: MEDICARE

## 2025-05-20 DIAGNOSIS — Z96.1: ICD-10-CM

## 2025-05-20 PROCEDURE — 99024 POSTOP FOLLOW-UP VISIT: CPT | Performed by: OPTOMETRIST

## 2025-05-20 ASSESSMENT — REFRACTION_AUTOREFRACTION
OS_SPHERE: +1.75
OS_AXIS: 117
OD_AXIS: 094
OD_SPHERE: +1.00
OD_CYLINDER: -0.50
OS_CYLINDER: -3.00

## 2025-05-20 ASSESSMENT — KERATOMETRY
OS_K1POWER_DIOPTERS: 45.75
OS_K2POWER_DIOPTERS: 48.00
OD_K1POWER_DIOPTERS: 45.75
OD_AXISANGLE_DEGREES: 002
OD_K2POWER_DIOPTERS: 48.00
OS_AXISANGLE_DEGREES: 013
METHOD_AUTO_MANUAL: AUTO

## 2025-05-20 ASSESSMENT — LID POSITION - PTOSIS
OS_PTOSIS: LUL 2+
OD_PTOSIS: RUL 2+

## 2025-05-20 ASSESSMENT — CONFRONTATIONAL VISUAL FIELD TEST (CVF)
OS_FINDINGS: FULL
OD_FINDINGS: FULL

## 2025-05-20 ASSESSMENT — SUPERFICIAL PUNCTATE KERATITIS (SPK)
OS_SPK: 1+
OD_SPK: 1+

## 2025-05-20 ASSESSMENT — REFRACTION_CURRENTRX
OS_VPRISM_DIRECTION: SV
OS_SPHERE: -0.75
OS_CYLINDER: -1.25
OD_OVR_VA: 20/
OD_AXIS: 097
OD_VPRISM_DIRECTION: SV
OS_OVR_VA: 20/
OD_CYLINDER: -1.50
OD_SPHERE: +0.50
OS_AXIS: 038

## 2025-05-20 ASSESSMENT — TONOMETRY: OD_IOP_MMHG: 13

## 2025-05-20 ASSESSMENT — VISUAL ACUITY
OS_BCVA: 20/20-1
OD_BCVA: 20/60-1

## 2025-05-28 ENCOUNTER — OFFICE (OUTPATIENT)
Dept: URBAN - METROPOLITAN AREA CLINIC 12 | Facility: CLINIC | Age: 75
Setting detail: OPHTHALMOLOGY
End: 2025-05-28
Payer: MEDICARE

## 2025-05-28 DIAGNOSIS — Z96.1: ICD-10-CM

## 2025-05-28 PROCEDURE — 99024 POSTOP FOLLOW-UP VISIT: CPT | Performed by: OPTOMETRIST

## 2025-05-28 ASSESSMENT — VISUAL ACUITY
OS_BCVA: 20/20-2
OD_BCVA: 20/40-1

## 2025-05-28 ASSESSMENT — KERATOMETRY
OS_AXISANGLE_DEGREES: 034
OD_K1POWER_DIOPTERS: 45.50
OS_K2POWER_DIOPTERS: 47.25
METHOD_AUTO_MANUAL: AUTO
OS_K1POWER_DIOPTERS: 46.25
OD_AXISANGLE_DEGREES: 179
OD_K2POWER_DIOPTERS: 47.25

## 2025-05-28 ASSESSMENT — REFRACTION_AUTOREFRACTION
OS_AXIS: 133
OD_CYLINDER: -0.25
OD_AXIS: 179
OS_CYLINDER: -2.00
OS_SPHERE: +0.75
OD_SPHERE: +0.75

## 2025-05-28 ASSESSMENT — TONOMETRY
OD_IOP_MMHG: 19
OS_IOP_MMHG: 16

## 2025-05-28 ASSESSMENT — REFRACTION_CURRENTRX
OD_OVR_VA: 20/
OS_AXIS: 038
OD_CYLINDER: -1.50
OD_SPHERE: +0.50
OS_CYLINDER: -1.25
OS_VPRISM_DIRECTION: SV
OD_AXIS: 097
OD_VPRISM_DIRECTION: SV
OS_SPHERE: -0.75
OS_OVR_VA: 20/

## 2025-05-28 ASSESSMENT — LID POSITION - PTOSIS
OD_PTOSIS: RUL 2+
OS_PTOSIS: LUL 2+

## 2025-05-28 ASSESSMENT — CONFRONTATIONAL VISUAL FIELD TEST (CVF)
OD_FINDINGS: FULL
OS_FINDINGS: FULL

## 2025-05-28 ASSESSMENT — SUPERFICIAL PUNCTATE KERATITIS (SPK)
OD_SPK: 1+
OS_SPK: 1+

## 2025-06-13 ENCOUNTER — OFFICE (OUTPATIENT)
Dept: URBAN - METROPOLITAN AREA CLINIC 12 | Facility: CLINIC | Age: 75
Setting detail: OPHTHALMOLOGY
End: 2025-06-13
Payer: MEDICARE

## 2025-06-13 DIAGNOSIS — Z96.1: ICD-10-CM

## 2025-06-13 PROCEDURE — 99024 POSTOP FOLLOW-UP VISIT: CPT | Performed by: OPHTHALMOLOGY

## 2025-06-13 ASSESSMENT — VISUAL ACUITY
OS_BCVA: 20/20-2
OD_BCVA: 20/25-2

## 2025-06-13 ASSESSMENT — REFRACTION_CURRENTRX
OD_AXIS: 097
OS_CYLINDER: -1.25
OD_CYLINDER: -1.50
OD_SPHERE: +0.50
OS_SPHERE: -0.75
OD_OVR_VA: 20/
OS_VPRISM_DIRECTION: SV
OD_VPRISM_DIRECTION: SV
OS_AXIS: 038
OS_OVR_VA: 20/

## 2025-06-13 ASSESSMENT — LID POSITION - PTOSIS
OD_PTOSIS: RUL 2+
OS_PTOSIS: LUL 2+

## 2025-06-13 ASSESSMENT — KERATOMETRY
OD_K1POWER_DIOPTERS: 44.75
OS_AXISANGLE_DEGREES: 177
METHOD_AUTO_MANUAL: AUTO
OD_K2POWER_DIOPTERS: 47.50
OS_K2POWER_DIOPTERS: 47.50
OS_K1POWER_DIOPTERS: 46.50
OD_AXISANGLE_DEGREES: 176

## 2025-06-13 ASSESSMENT — REFRACTION_AUTOREFRACTION
OD_AXIS: 071
OS_AXIS: 128
OD_CYLINDER: -0.75
OS_CYLINDER: -1.00
OS_SPHERE: +0.50
OD_SPHERE: +1.25

## 2025-06-13 ASSESSMENT — TONOMETRY
OD_IOP_MMHG: 13
OS_IOP_MMHG: 12

## 2025-06-13 ASSESSMENT — SUPERFICIAL PUNCTATE KERATITIS (SPK)
OS_SPK: 1+
OD_SPK: 1+

## 2025-06-25 ENCOUNTER — APPOINTMENT (OUTPATIENT)
Dept: CARDIOLOGY | Facility: CLINIC | Age: 75
End: 2025-06-25
Payer: MEDICARE

## 2025-06-25 VITALS
OXYGEN SATURATION: 96 % | HEART RATE: 67 BPM | HEIGHT: 61 IN | WEIGHT: 132 LBS | BODY MASS INDEX: 24.92 KG/M2 | SYSTOLIC BLOOD PRESSURE: 128 MMHG | DIASTOLIC BLOOD PRESSURE: 68 MMHG

## 2025-06-25 PROBLEM — R42 LIGHTHEADED: Status: ACTIVE | Noted: 2025-06-25

## 2025-06-25 PROCEDURE — 93000 ELECTROCARDIOGRAM COMPLETE: CPT

## 2025-06-25 PROCEDURE — 99214 OFFICE O/P EST MOD 30 MIN: CPT | Mod: 25

## 2025-07-11 ENCOUNTER — NON-APPOINTMENT (OUTPATIENT)
Age: 75
End: 2025-07-11

## 2025-07-24 ENCOUNTER — APPOINTMENT (OUTPATIENT)
Dept: ORTHOPEDIC SURGERY | Facility: CLINIC | Age: 75
End: 2025-07-24
Payer: MEDICARE

## 2025-07-24 PROCEDURE — 99213 OFFICE O/P EST LOW 20 MIN: CPT

## 2025-07-24 PROCEDURE — 73502 X-RAY EXAM HIP UNI 2-3 VIEWS: CPT

## 2025-07-31 ENCOUNTER — APPOINTMENT (OUTPATIENT)
Dept: CARDIOLOGY | Facility: CLINIC | Age: 75
End: 2025-07-31
Payer: MEDICARE

## 2025-07-31 VITALS — HEART RATE: 68 BPM | BODY MASS INDEX: 24.92 KG/M2 | WEIGHT: 132 LBS | HEIGHT: 61 IN

## 2025-07-31 VITALS — SYSTOLIC BLOOD PRESSURE: 140 MMHG | DIASTOLIC BLOOD PRESSURE: 78 MMHG

## 2025-07-31 DIAGNOSIS — I10 ESSENTIAL (PRIMARY) HYPERTENSION: ICD-10-CM

## 2025-07-31 DIAGNOSIS — E78.5 HYPERLIPIDEMIA, UNSPECIFIED: ICD-10-CM

## 2025-07-31 DIAGNOSIS — L40.50 ARTHROPATHIC PSORIASIS, UNSPECIFIED: ICD-10-CM

## 2025-07-31 PROCEDURE — 99214 OFFICE O/P EST MOD 30 MIN: CPT

## 2025-07-31 RX ORDER — HYDROCHLOROTHIAZIDE 12.5 MG/1
12.5 TABLET ORAL DAILY
Qty: 90 | Refills: 3 | Status: ACTIVE | COMMUNITY
Start: 2025-07-31 | End: 1900-01-01

## 2025-07-31 RX ORDER — IRBESARTAN 75 MG/1
75 TABLET ORAL DAILY
Qty: 270 | Refills: 3 | Status: ACTIVE | COMMUNITY
Start: 2025-07-31 | End: 1900-01-01

## 2025-07-31 RX ORDER — AMOXICILLIN 500 MG/1
500 TABLET, FILM COATED ORAL
Qty: 20 | Refills: 0 | Status: DISCONTINUED | COMMUNITY
Start: 2025-07-24 | End: 2025-07-31

## 2025-09-04 ENCOUNTER — APPOINTMENT (OUTPATIENT)
Dept: CARDIOLOGY | Facility: CLINIC | Age: 75
End: 2025-09-04
Payer: MEDICARE

## 2025-09-04 VITALS
HEIGHT: 61 IN | BODY MASS INDEX: 24.92 KG/M2 | WEIGHT: 132 LBS | HEART RATE: 67 BPM | DIASTOLIC BLOOD PRESSURE: 68 MMHG | SYSTOLIC BLOOD PRESSURE: 132 MMHG | OXYGEN SATURATION: 97 %

## 2025-09-04 DIAGNOSIS — I10 ESSENTIAL (PRIMARY) HYPERTENSION: ICD-10-CM

## 2025-09-04 DIAGNOSIS — E78.5 HYPERLIPIDEMIA, UNSPECIFIED: ICD-10-CM

## 2025-09-04 PROCEDURE — 99214 OFFICE O/P EST MOD 30 MIN: CPT

## 2025-09-04 RX ORDER — DILTIAZEM HYDROCHLORIDE 120 MG/1
120 CAPSULE, COATED, EXTENDED RELEASE ORAL
Refills: 0 | Status: ACTIVE | COMMUNITY

## (undated) DEVICE — HOOD FLYTE STRYKER HELMET SHIELD

## (undated) DEVICE — WARMING BLANKET UPPER ADULT

## (undated) DEVICE — NDL HYPO SAFE 22G X 1.5" (BLACK)

## (undated) DEVICE — SPECIMEN TRAP 70ML

## (undated) DEVICE — SAW BLADE MICROAIRE RECIPROCATING 12.2MMX80MMX1.78MM

## (undated) DEVICE — SOL INJ NS 0.9% 500ML 1-PORT

## (undated) DEVICE — POSITIONER FOAM ABDUCTION PILLOW SM (PINK)

## (undated) DEVICE — SUT QUILL MONODERM 0 1/2 CIRCLE TAPR 45CM 26MM

## (undated) DEVICE — POSITIONER FOAM EGG CRATE ULNAR 2PCS (PINK)

## (undated) DEVICE — DRSG DERMABOND 0.7ML

## (undated) DEVICE — SOL IRR POUR NS 0.9% 500ML

## (undated) DEVICE — SUT QUILL MONODERM 2-0 3/8 CIRCLE 45CM

## (undated) DEVICE — TAPE SILK 3"

## (undated) DEVICE — GLV 7.5 PROTEXIS (WHITE)

## (undated) DEVICE — SYR LUER LOK 20CC

## (undated) DEVICE — SOL INJ NS 0.9% 1000ML

## (undated) DEVICE — Device

## (undated) DEVICE — ELCTR BOVIE TIP BLADE VALLEYLAB 6.5"

## (undated) DEVICE — TUBING TUR 2 PRONG

## (undated) DEVICE — WOUND IRR SURGIPHOR

## (undated) DEVICE — SPECIMEN CONTAINER 100ML

## (undated) DEVICE — PACK TOTAL HIP (2 PACKS)

## (undated) DEVICE — SUT POLYSORB 0 36" GS-25 UNDYED

## (undated) DEVICE — SOL BETADINE POUCH 0.75OZ STERILE

## (undated) DEVICE — SUT MONOCRYL 3-0 18" PS-2 UNDYED

## (undated) DEVICE — SUT TICRON 0 30" TIES

## (undated) DEVICE — SUT POLYSORB 2-0 30" GS-21 UNDYED

## (undated) DEVICE — VENODYNE/SCD SLEEVE CALF LARGE

## (undated) DEVICE — STRYKER INTERPULSE HANDPIECE W IRR SUCTION TUBE

## (undated) DEVICE — ELCTR BOVIE PENCIL SMOKE EVACUATION

## (undated) DEVICE — SOL IRR POUR H2O 1000ML

## (undated) DEVICE — GLV 8.5 PROTEXIS (WHITE)

## (undated) DEVICE — GLV 8 PROTEXIS (WHITE)

## (undated) DEVICE — TUBING SUCTION NON CONDUCTIVE 316X18" STERILE

## (undated) DEVICE — NDL HYPO SAFE 18G X 1.5" (PINK)

## (undated) DEVICE — SUT POLYSORB 1 36" GS-25

## (undated) DEVICE — SUT ETHIBOND EXCEL 2 30" OS-4

## (undated) DEVICE — DRSG AQUACEL 3.5 X 10"